# Patient Record
Sex: FEMALE | Race: WHITE | Employment: OTHER | URBAN - NONMETROPOLITAN AREA
[De-identification: names, ages, dates, MRNs, and addresses within clinical notes are randomized per-mention and may not be internally consistent; named-entity substitution may affect disease eponyms.]

---

## 2019-01-01 ENCOUNTER — HOSPITAL ENCOUNTER (OUTPATIENT)
Dept: GENERAL RADIOLOGY | Age: 74
Discharge: HOME | End: 2019-11-19
Payer: MEDICARE

## 2019-01-01 ENCOUNTER — HOSPITAL ENCOUNTER (EMERGENCY)
Dept: GENERAL RADIOLOGY | Age: 74
Discharge: HOME | End: 2019-11-19
Payer: MEDICARE

## 2019-01-01 ENCOUNTER — HOSPITAL ENCOUNTER (OUTPATIENT)
Age: 74
Discharge: HOME | End: 2019-12-10
Payer: MEDICARE

## 2019-01-01 ENCOUNTER — TELEPHONE (OUTPATIENT)
Dept: WOUND CARE | Age: 74
End: 2019-01-01

## 2019-01-01 ENCOUNTER — TELEPHONE (OUTPATIENT)
Dept: FAMILY MEDICINE CLINIC | Age: 74
End: 2019-01-01

## 2019-01-01 ENCOUNTER — HOSPITAL ENCOUNTER (OUTPATIENT)
Dept: PULMONOLOGY | Age: 74
Discharge: HOME | End: 2019-11-07
Payer: MEDICARE

## 2019-01-01 ENCOUNTER — HOSPITAL ENCOUNTER (EMERGENCY)
Age: 74
Discharge: HOME | End: 2019-11-19
Payer: MEDICARE

## 2019-01-01 ENCOUNTER — HOSPITAL ENCOUNTER (OUTPATIENT)
Dept: WOUND CARE | Age: 74
Discharge: HOME | End: 2019-11-07
Payer: MEDICARE

## 2019-01-01 ENCOUNTER — OFFICE VISIT (OUTPATIENT)
Dept: FAMILY MEDICINE CLINIC | Age: 74
End: 2019-01-01
Payer: MEDICARE

## 2019-01-01 ENCOUNTER — TELEPHONE (OUTPATIENT)
Dept: PULMONOLOGY | Age: 74
End: 2019-01-01

## 2019-01-01 ENCOUNTER — OFFICE VISIT (OUTPATIENT)
Dept: PULMONOLOGY | Age: 74
End: 2019-01-01
Payer: MEDICARE

## 2019-01-01 ENCOUNTER — TELEPHONE (OUTPATIENT)
Dept: ADMINISTRATIVE | Age: 74
End: 2019-01-01

## 2019-01-01 ENCOUNTER — HOSPITAL ENCOUNTER (OUTPATIENT)
Age: 74
Discharge: HOME | End: 2019-11-19
Payer: MEDICARE

## 2019-01-01 VITALS
DIASTOLIC BLOOD PRESSURE: 70 MMHG | HEIGHT: 66 IN | TEMPERATURE: 98.1 F | HEART RATE: 78 BPM | BODY MASS INDEX: 44.2 KG/M2 | OXYGEN SATURATION: 97 % | SYSTOLIC BLOOD PRESSURE: 138 MMHG | WEIGHT: 275 LBS

## 2019-01-01 VITALS
DIASTOLIC BLOOD PRESSURE: 62 MMHG | HEART RATE: 76 BPM | TEMPERATURE: 98.2 F | RESPIRATION RATE: 10 BRPM | SYSTOLIC BLOOD PRESSURE: 132 MMHG

## 2019-01-01 VITALS
DIASTOLIC BLOOD PRESSURE: 70 MMHG | RESPIRATION RATE: 18 BRPM | HEART RATE: 72 BPM | SYSTOLIC BLOOD PRESSURE: 136 MMHG | TEMPERATURE: 98.6 F | OXYGEN SATURATION: 95 %

## 2019-01-01 VITALS
HEIGHT: 66 IN | RESPIRATION RATE: 16 BRPM | DIASTOLIC BLOOD PRESSURE: 70 MMHG | SYSTOLIC BLOOD PRESSURE: 189 MMHG | TEMPERATURE: 97.3 F | HEART RATE: 72 BPM | WEIGHT: 275 LBS | OXYGEN SATURATION: 96 % | BODY MASS INDEX: 44.2 KG/M2

## 2019-01-01 VITALS
RESPIRATION RATE: 16 BRPM | SYSTOLIC BLOOD PRESSURE: 122 MMHG | TEMPERATURE: 97.9 F | DIASTOLIC BLOOD PRESSURE: 58 MMHG | HEART RATE: 80 BPM

## 2019-01-01 DIAGNOSIS — E11.69 TYPE 2 DIABETES MELLITUS WITH OTHER SPECIFIED COMPLICATION, WITH LONG-TERM CURRENT USE OF INSULIN (HCC): ICD-10-CM

## 2019-01-01 DIAGNOSIS — E03.9 HYPOTHYROIDISM, UNSPECIFIED TYPE: ICD-10-CM

## 2019-01-01 DIAGNOSIS — Z79.4 TYPE 2 DIABETES MELLITUS WITH OTHER SPECIFIED COMPLICATION, WITH LONG-TERM CURRENT USE OF INSULIN (HCC): Primary | ICD-10-CM

## 2019-01-01 DIAGNOSIS — J47.9 BRONCHIECTASIS WITHOUT COMPLICATION (HCC): ICD-10-CM

## 2019-01-01 DIAGNOSIS — G89.29 CHRONIC BILATERAL LOW BACK PAIN WITHOUT SCIATICA: Primary | Chronic | ICD-10-CM

## 2019-01-01 DIAGNOSIS — M25.512 CHRONIC PAIN OF BOTH SHOULDERS: ICD-10-CM

## 2019-01-01 DIAGNOSIS — I89.0 LYMPHEDEMA OF BOTH LOWER EXTREMITIES: ICD-10-CM

## 2019-01-01 DIAGNOSIS — E11.42 DIABETIC POLYNEUROPATHY ASSOCIATED WITH TYPE 2 DIABETES MELLITUS (HCC): ICD-10-CM

## 2019-01-01 DIAGNOSIS — M25.511 CHRONIC PAIN OF BOTH SHOULDERS: ICD-10-CM

## 2019-01-01 DIAGNOSIS — E11.69 TYPE 2 DIABETES MELLITUS WITH OTHER SPECIFIED COMPLICATION, WITH LONG-TERM CURRENT USE OF INSULIN (HCC): Primary | ICD-10-CM

## 2019-01-01 DIAGNOSIS — R80.9 MICROALBUMINURIA DUE TO TYPE 2 DIABETES MELLITUS (HCC): Chronic | ICD-10-CM

## 2019-01-01 DIAGNOSIS — J45.41 MODERATE PERSISTENT ASTHMA WITH EXACERBATION: Primary | ICD-10-CM

## 2019-01-01 DIAGNOSIS — J47.9 BRONCHIECTASIS WITHOUT COMPLICATION (HCC): Chronic | ICD-10-CM

## 2019-01-01 DIAGNOSIS — I48.91 ATRIAL FIBRILLATION, UNSPECIFIED TYPE (HCC): ICD-10-CM

## 2019-01-01 DIAGNOSIS — S63.501A SPRAIN OF RIGHT WRIST, INITIAL ENCOUNTER: Primary | ICD-10-CM

## 2019-01-01 DIAGNOSIS — N18.30 CKD (CHRONIC KIDNEY DISEASE) STAGE 3, GFR 30-59 ML/MIN (HCC): ICD-10-CM

## 2019-01-01 DIAGNOSIS — E78.5 DYSLIPIDEMIA: ICD-10-CM

## 2019-01-01 DIAGNOSIS — M54.50 CHRONIC BILATERAL LOW BACK PAIN WITHOUT SCIATICA: Primary | Chronic | ICD-10-CM

## 2019-01-01 DIAGNOSIS — G89.29 CHRONIC PAIN OF BOTH SHOULDERS: ICD-10-CM

## 2019-01-01 DIAGNOSIS — K04.7 DENTAL INFECTION: Primary | ICD-10-CM

## 2019-01-01 DIAGNOSIS — J44.9 ASTHMA-COPD OVERLAP SYNDROME (HCC): Primary | ICD-10-CM

## 2019-01-01 DIAGNOSIS — I25.10 ASHD (ARTERIOSCLEROTIC HEART DISEASE): ICD-10-CM

## 2019-01-01 DIAGNOSIS — Z23 NEEDS FLU SHOT: ICD-10-CM

## 2019-01-01 DIAGNOSIS — E11.29 MICROALBUMINURIA DUE TO TYPE 2 DIABETES MELLITUS (HCC): Chronic | ICD-10-CM

## 2019-01-01 DIAGNOSIS — J44.9 ASTHMA-COPD OVERLAP SYNDROME (HCC): ICD-10-CM

## 2019-01-01 DIAGNOSIS — N89.8 VAGINAL CYST: ICD-10-CM

## 2019-01-01 DIAGNOSIS — S81.801D WOUND OF RIGHT LOWER EXTREMITY, SUBSEQUENT ENCOUNTER: ICD-10-CM

## 2019-01-01 DIAGNOSIS — Z86.19 HISTORY OF SEPSIS: ICD-10-CM

## 2019-01-01 DIAGNOSIS — Z12.31 ENCOUNTER FOR SCREENING MAMMOGRAM FOR MALIGNANT NEOPLASM OF BREAST: ICD-10-CM

## 2019-01-01 DIAGNOSIS — I10 HYPERTENSION, ESSENTIAL: ICD-10-CM

## 2019-01-01 DIAGNOSIS — G47.33 OSA (OBSTRUCTIVE SLEEP APNEA): ICD-10-CM

## 2019-01-01 DIAGNOSIS — D64.9 NORMOCYTIC ANEMIA: ICD-10-CM

## 2019-01-01 DIAGNOSIS — K04.7 DENTAL INFECTION: ICD-10-CM

## 2019-01-01 DIAGNOSIS — E66.01 MORBID OBESITY WITH BMI OF 40.0-44.9, ADULT (HCC): ICD-10-CM

## 2019-01-01 DIAGNOSIS — S81.802A WOUND OF LEFT LOWER EXTREMITY, INITIAL ENCOUNTER: ICD-10-CM

## 2019-01-01 DIAGNOSIS — S81.801A WOUND OF RIGHT LOWER EXTREMITY, INITIAL ENCOUNTER: ICD-10-CM

## 2019-01-01 DIAGNOSIS — G25.81 RLS (RESTLESS LEGS SYNDROME): ICD-10-CM

## 2019-01-01 DIAGNOSIS — S81.801D WOUND OF RIGHT LOWER EXTREMITY, SUBSEQUENT ENCOUNTER: Primary | ICD-10-CM

## 2019-01-01 DIAGNOSIS — Z79.4 TYPE 2 DIABETES MELLITUS WITH OTHER SPECIFIED COMPLICATION, WITH LONG-TERM CURRENT USE OF INSULIN (HCC): ICD-10-CM

## 2019-01-01 DIAGNOSIS — N18.30 CKD (CHRONIC KIDNEY DISEASE) STAGE 3, GFR 30-59 ML/MIN (HCC): Chronic | ICD-10-CM

## 2019-01-01 DIAGNOSIS — E83.42 HYPOMAGNESEMIA: ICD-10-CM

## 2019-01-01 DIAGNOSIS — S81.802D WOUND OF LEFT LOWER EXTREMITY, SUBSEQUENT ENCOUNTER: ICD-10-CM

## 2019-01-01 DIAGNOSIS — J39.8 TRACHEOMALACIA, ACQUIRED: ICD-10-CM

## 2019-01-01 LAB
AEROBIC CULTURE: ABNORMAL
AEROBIC CULTURE: ABNORMAL
ALBUMIN SERPL-MCNC: 3.9 G/DL (ref 3.5–5.1)
ALP BLD-CCNC: 85 U/L (ref 38–126)
ALT SERPL-CCNC: 13 U/L (ref 11–66)
ANION GAP SERPL CALCULATED.3IONS-SCNC: 14 MEQ/L (ref 8–16)
AST SERPL-CCNC: 21 U/L (ref 5–40)
BASOPHILS # BLD: 0.6 %
BASOPHILS ABSOLUTE: 0.1 THOU/MM3 (ref 0–0.1)
BILIRUB SERPL-MCNC: 0.4 MG/DL (ref 0.3–1.2)
BUN BLDV-MCNC: 19 MG/DL (ref 7–22)
C-REACTIVE PROTEIN: 1.15 MG/DL (ref 0–1)
CALCIUM SERPL-MCNC: 10 MG/DL (ref 8.5–10.5)
CHLORIDE BLD-SCNC: 100 MEQ/L (ref 98–111)
CHOLESTEROL, TOTAL: 170 MG/DL (ref 100–199)
CO2: 27 MEQ/L (ref 23–33)
CREAT SERPL-MCNC: 0.8 MG/DL (ref 0.4–1.2)
CREATININE, URINE: 41.5 MG/DL
EOSINOPHIL # BLD: 5.3 %
EOSINOPHILS ABSOLUTE: 0.5 THOU/MM3 (ref 0–0.4)
ERYTHROCYTE [DISTWIDTH] IN BLOOD BY AUTOMATED COUNT: 16.4 % (ref 11.5–14.5)
ERYTHROCYTE [DISTWIDTH] IN BLOOD BY AUTOMATED COUNT: 51.3 FL (ref 35–45)
GFR SERPL CREATININE-BSD FRML MDRD: 70 ML/MIN/1.73M2
GLUCOSE BLD-MCNC: 109 MG/DL (ref 70–108)
GRAM STAIN RESULT: ABNORMAL
HBA1C MFR BLD: 8 %
HCT VFR BLD CALC: 33.5 % (ref 37–47)
HDLC SERPL-MCNC: 59 MG/DL
HEMOGLOBIN: 9.8 GM/DL (ref 12–16)
IMMATURE GRANS (ABS): 0.03 THOU/MM3 (ref 0–0.07)
IMMATURE GRANULOCYTES: 0.3 %
LDL CHOLESTEROL CALCULATED: 89 MG/DL
LYMPHOCYTES # BLD: 32.3 %
LYMPHOCYTES ABSOLUTE: 3 THOU/MM3 (ref 1–4.8)
MCH RBC QN AUTO: 25.4 PG (ref 26–33)
MCHC RBC AUTO-ENTMCNC: 29.3 GM/DL (ref 32.2–35.5)
MCV RBC AUTO: 86.8 FL (ref 81–99)
MICROALBUMIN UR-MCNC: 9.85 MG/DL
MICROALBUMIN/CREAT UR-RTO: 237 MG/G (ref 0–30)
MONOCYTES # BLD: 9.8 %
MONOCYTES ABSOLUTE: 0.9 THOU/MM3 (ref 0.4–1.3)
NUCLEATED RED BLOOD CELLS: 0 /100 WBC
ORGANISM: ABNORMAL
PLATELET # BLD: 313 THOU/MM3 (ref 130–400)
PMV BLD AUTO: 9.8 FL (ref 9.4–12.4)
POTASSIUM SERPL-SCNC: 5 MEQ/L (ref 3.5–5.2)
RBC # BLD: 3.86 MILL/MM3 (ref 4.2–5.4)
SEG NEUTROPHILS: 51.7 %
SEGMENTED NEUTROPHILS ABSOLUTE COUNT: 4.9 THOU/MM3 (ref 1.8–7.7)
SODIUM BLD-SCNC: 141 MEQ/L (ref 135–145)
T4 FREE: 0.68 NG/DL (ref 0.93–1.76)
TOTAL PROTEIN: 7.2 G/DL (ref 6.1–8)
TRIGL SERPL-MCNC: 111 MG/DL (ref 0–199)
TSH SERPL DL<=0.05 MIU/L-ACNC: 44.63 UIU/ML (ref 0.4–4.2)
WBC # BLD: 9.4 THOU/MM3 (ref 4.8–10.8)

## 2019-01-01 PROCEDURE — 87077 CULTURE AEROBIC IDENTIFY: CPT

## 2019-01-01 PROCEDURE — 87070 CULTURE OTHR SPECIMN AEROBIC: CPT

## 2019-01-01 PROCEDURE — G8417 CALC BMI ABV UP PARAM F/U: HCPCS | Performed by: INTERNAL MEDICINE

## 2019-01-01 PROCEDURE — G8926 SPIRO NO PERF OR DOC: HCPCS | Performed by: INTERNAL MEDICINE

## 2019-01-01 PROCEDURE — 3017F COLORECTAL CA SCREEN DOC REV: CPT | Performed by: FAMILY MEDICINE

## 2019-01-01 PROCEDURE — G8482 FLU IMMUNIZE ORDER/ADMIN: HCPCS | Performed by: INTERNAL MEDICINE

## 2019-01-01 PROCEDURE — 94060 EVALUATION OF WHEEZING: CPT

## 2019-01-01 PROCEDURE — G8598 ASA/ANTIPLAT THER USED: HCPCS | Performed by: INTERNAL MEDICINE

## 2019-01-01 PROCEDURE — 99204 OFFICE O/P NEW MOD 45 MIN: CPT | Performed by: INTERNAL MEDICINE

## 2019-01-01 PROCEDURE — 80053 COMPREHEN METABOLIC PANEL: CPT

## 2019-01-01 PROCEDURE — 83036 HEMOGLOBIN GLYCOSYLATED A1C: CPT | Performed by: FAMILY MEDICINE

## 2019-01-01 PROCEDURE — 86140 C-REACTIVE PROTEIN: CPT

## 2019-01-01 PROCEDURE — 73590 X-RAY EXAM OF LOWER LEG: CPT

## 2019-01-01 PROCEDURE — 3045F PR MOST RECENT HEMOGLOBIN A1C LEVEL 7.0-9.0%: CPT | Performed by: FAMILY MEDICINE

## 2019-01-01 PROCEDURE — 1090F PRES/ABSN URINE INCON ASSESS: CPT | Performed by: FAMILY MEDICINE

## 2019-01-01 PROCEDURE — 36415 COLL VENOUS BLD VENIPUNCTURE: CPT

## 2019-01-01 PROCEDURE — 2022F DILAT RTA XM EVC RTNOPTHY: CPT | Performed by: FAMILY MEDICINE

## 2019-01-01 PROCEDURE — 1123F ACP DISCUSS/DSCN MKR DOCD: CPT | Performed by: FAMILY MEDICINE

## 2019-01-01 PROCEDURE — 1036F TOBACCO NON-USER: CPT | Performed by: FAMILY MEDICINE

## 2019-01-01 PROCEDURE — 99204 OFFICE O/P NEW MOD 45 MIN: CPT | Performed by: FAMILY MEDICINE

## 2019-01-01 PROCEDURE — 3023F SPIROM DOC REV: CPT | Performed by: INTERNAL MEDICINE

## 2019-01-01 PROCEDURE — 84443 ASSAY THYROID STIM HORMONE: CPT

## 2019-01-01 PROCEDURE — G8421 BMI NOT CALCULATED: HCPCS | Performed by: FAMILY MEDICINE

## 2019-01-01 PROCEDURE — 87147 CULTURE TYPE IMMUNOLOGIC: CPT

## 2019-01-01 PROCEDURE — 3023F SPIROM DOC REV: CPT | Performed by: FAMILY MEDICINE

## 2019-01-01 PROCEDURE — G8598 ASA/ANTIPLAT THER USED: HCPCS | Performed by: FAMILY MEDICINE

## 2019-01-01 PROCEDURE — G8427 DOCREV CUR MEDS BY ELIG CLIN: HCPCS | Performed by: INTERNAL MEDICINE

## 2019-01-01 PROCEDURE — G8427 DOCREV CUR MEDS BY ELIG CLIN: HCPCS | Performed by: FAMILY MEDICINE

## 2019-01-01 PROCEDURE — 4040F PNEUMOC VAC/ADMIN/RCVD: CPT | Performed by: FAMILY MEDICINE

## 2019-01-01 PROCEDURE — 2709999900 HC NON-CHARGEABLE SUPPLY

## 2019-01-01 PROCEDURE — 99213 OFFICE O/P EST LOW 20 MIN: CPT | Performed by: NURSE PRACTITIONER

## 2019-01-01 PROCEDURE — 71046 X-RAY EXAM CHEST 2 VIEWS: CPT

## 2019-01-01 PROCEDURE — 82043 UR ALBUMIN QUANTITATIVE: CPT

## 2019-01-01 PROCEDURE — G8400 PT W/DXA NO RESULTS DOC: HCPCS | Performed by: FAMILY MEDICINE

## 2019-01-01 PROCEDURE — 4040F PNEUMOC VAC/ADMIN/RCVD: CPT | Performed by: INTERNAL MEDICINE

## 2019-01-01 PROCEDURE — 94729 DIFFUSING CAPACITY: CPT

## 2019-01-01 PROCEDURE — 1123F ACP DISCUSS/DSCN MKR DOCD: CPT | Performed by: INTERNAL MEDICINE

## 2019-01-01 PROCEDURE — G8926 SPIRO NO PERF OR DOC: HCPCS | Performed by: FAMILY MEDICINE

## 2019-01-01 PROCEDURE — 3017F COLORECTAL CA SCREEN DOC REV: CPT | Performed by: INTERNAL MEDICINE

## 2019-01-01 PROCEDURE — G8482 FLU IMMUNIZE ORDER/ADMIN: HCPCS | Performed by: FAMILY MEDICINE

## 2019-01-01 PROCEDURE — 73110 X-RAY EXAM OF WRIST: CPT

## 2019-01-01 PROCEDURE — 85025 COMPLETE CBC W/AUTO DIFF WBC: CPT

## 2019-01-01 PROCEDURE — 99213 OFFICE O/P EST LOW 20 MIN: CPT

## 2019-01-01 PROCEDURE — G0008 ADMIN INFLUENZA VIRUS VAC: HCPCS | Performed by: FAMILY MEDICINE

## 2019-01-01 PROCEDURE — 1036F TOBACCO NON-USER: CPT | Performed by: INTERNAL MEDICINE

## 2019-01-01 PROCEDURE — 99214 OFFICE O/P EST MOD 30 MIN: CPT | Performed by: FAMILY MEDICINE

## 2019-01-01 PROCEDURE — 84439 ASSAY OF FREE THYROXINE: CPT

## 2019-01-01 PROCEDURE — G8400 PT W/DXA NO RESULTS DOC: HCPCS | Performed by: INTERNAL MEDICINE

## 2019-01-01 PROCEDURE — 90653 IIV ADJUVANT VACCINE IM: CPT | Performed by: FAMILY MEDICINE

## 2019-01-01 PROCEDURE — 1090F PRES/ABSN URINE INCON ASSESS: CPT | Performed by: INTERNAL MEDICINE

## 2019-01-01 PROCEDURE — G8417 CALC BMI ABV UP PARAM F/U: HCPCS | Performed by: FAMILY MEDICINE

## 2019-01-01 PROCEDURE — 87186 SC STD MICRODIL/AGAR DIL: CPT

## 2019-01-01 PROCEDURE — 99204 OFFICE O/P NEW MOD 45 MIN: CPT | Performed by: NURSE PRACTITIONER

## 2019-01-01 PROCEDURE — 87205 SMEAR GRAM STAIN: CPT

## 2019-01-01 PROCEDURE — 80061 LIPID PANEL: CPT

## 2019-01-01 RX ORDER — AMOXICILLIN AND CLAVULANATE POTASSIUM 875; 125 MG/1; MG/1
1 TABLET, FILM COATED ORAL 2 TIMES DAILY
COMMUNITY
Start: 2019-01-01 | End: 2019-01-01

## 2019-01-01 RX ORDER — PREDNISONE 20 MG/1
20 TABLET ORAL 2 TIMES DAILY
Qty: 14 TABLET | Refills: 0 | Status: SHIPPED | OUTPATIENT
Start: 2019-01-01 | End: 2019-01-01

## 2019-01-01 RX ORDER — LANOLIN ALCOHOL/MO/W.PET/CERES
325 CREAM (GRAM) TOPICAL 2 TIMES DAILY WITH MEALS
COMMUNITY
End: 2019-01-01

## 2019-01-01 RX ORDER — ALBUTEROL SULFATE 90 UG/1
2 AEROSOL, METERED RESPIRATORY (INHALATION) EVERY 4 HOURS PRN
Qty: 3 INHALER | Refills: 3 | Status: SHIPPED | OUTPATIENT
Start: 2019-01-01

## 2019-01-01 RX ORDER — ALBUTEROL SULFATE 90 UG/1
2 AEROSOL, METERED RESPIRATORY (INHALATION) EVERY 6 HOURS PRN
COMMUNITY
End: 2019-01-01

## 2019-01-01 RX ORDER — CHLORTHALIDONE 25 MG/1
1 TABLET ORAL DAILY
COMMUNITY
End: 2019-01-01

## 2019-01-01 RX ORDER — MAGNESIUM CHLORIDE 64 MG
1 TABLET, DELAYED RELEASE (ENTERIC COATED) ORAL 2 TIMES DAILY WITH MEALS
Status: CANCELLED | OUTPATIENT
Start: 2019-01-01

## 2019-01-01 RX ORDER — FUROSEMIDE 20 MG/1
10 TABLET ORAL DAILY PRN
COMMUNITY

## 2019-01-01 RX ORDER — CARVEDILOL 25 MG/1
50 TABLET ORAL 2 TIMES DAILY WITH MEALS
COMMUNITY
End: 2019-01-01

## 2019-01-01 RX ORDER — AZELASTINE HYDROCHLORIDE 137 UG/1
2 SPRAY, METERED NASAL DAILY
COMMUNITY
Start: 2018-03-27

## 2019-01-01 RX ORDER — MAGNESIUM CHLORIDE 64 MG
1 TABLET, DELAYED RELEASE (ENTERIC COATED) ORAL 2 TIMES DAILY WITH MEALS
COMMUNITY
End: 2019-01-01 | Stop reason: SDUPTHER

## 2019-01-01 RX ORDER — BUDESONIDE AND FORMOTEROL FUMARATE DIHYDRATE 160; 4.5 UG/1; UG/1
2 AEROSOL RESPIRATORY (INHALATION) 2 TIMES DAILY
COMMUNITY
End: 2019-01-01

## 2019-01-01 RX ORDER — OXYBUTYNIN CHLORIDE 5 MG/1
5 TABLET ORAL DAILY PRN
COMMUNITY
End: 2020-01-01 | Stop reason: SDUPTHER

## 2019-01-01 RX ORDER — LEVALBUTEROL TARTRATE 45 UG/1
1-2 AEROSOL, METERED ORAL EVERY 4 HOURS PRN
Qty: 3 INHALER | Refills: 3 | Status: SHIPPED | OUTPATIENT
Start: 2019-01-01 | End: 2019-01-01

## 2019-01-01 RX ORDER — ORPHENADRINE CITRATE 100 MG/1
100 TABLET, EXTENDED RELEASE ORAL 2 TIMES DAILY PRN
COMMUNITY
End: 2019-01-01 | Stop reason: SDUPTHER

## 2019-01-01 RX ORDER — PEN NEEDLE, DIABETIC 30 GX3/16"
NEEDLE, DISPOSABLE MISCELLANEOUS
Qty: 400 EACH | Refills: 3 | Status: SHIPPED | OUTPATIENT
Start: 2019-01-01

## 2019-01-01 RX ORDER — AMOXICILLIN 500 MG/1
CAPSULE ORAL
Qty: 7 CAPSULE | Refills: 0 | Status: ON HOLD | OUTPATIENT
Start: 2019-01-01 | End: 2020-01-01 | Stop reason: HOSPADM

## 2019-01-01 RX ORDER — LOSARTAN POTASSIUM 100 MG/1
100 TABLET ORAL DAILY
COMMUNITY
End: 2020-01-01 | Stop reason: SDUPTHER

## 2019-01-01 RX ORDER — LISINOPRIL 40 MG/1
1 TABLET ORAL DAILY
COMMUNITY
End: 2019-01-01

## 2019-01-01 RX ORDER — ACETAMINOPHEN 500 MG
1 TABLET ORAL 2 TIMES DAILY PRN
COMMUNITY

## 2019-01-01 RX ORDER — ALBUTEROL SULFATE 90 UG/1
2 AEROSOL, METERED RESPIRATORY (INHALATION) EVERY 6 HOURS PRN
Qty: 1 INHALER | Refills: 5 | Status: CANCELLED | OUTPATIENT
Start: 2019-01-01

## 2019-01-01 RX ORDER — SODIUM HYPOCHLORITE 1.25 MG/ML
SOLUTION TOPICAL
Qty: 1 BOTTLE | Refills: 5 | Status: SHIPPED | OUTPATIENT
Start: 2019-01-01

## 2019-01-01 RX ORDER — DILTIAZEM HYDROCHLORIDE 120 MG/1
120 CAPSULE, COATED, EXTENDED RELEASE ORAL 2 TIMES DAILY
COMMUNITY
End: 2019-01-01 | Stop reason: SDUPTHER

## 2019-01-01 RX ORDER — LEVOTHYROXINE SODIUM 88 UG/1
88 TABLET ORAL DAILY
Qty: 30 TABLET | Refills: 3 | Status: SHIPPED
Start: 2019-01-01 | End: 2020-01-01 | Stop reason: DRUGHIGH

## 2019-01-01 RX ORDER — LEVOTHYROXINE SODIUM 0.07 MG/1
75 TABLET ORAL DAILY
COMMUNITY
End: 2019-01-01 | Stop reason: DRUGHIGH

## 2019-01-01 RX ORDER — BENZONATATE 200 MG/1
200 CAPSULE ORAL 3 TIMES DAILY PRN
Qty: 90 CAPSULE | Refills: 5 | Status: ON HOLD | OUTPATIENT
Start: 2019-01-01 | End: 2020-01-01 | Stop reason: HOSPADM

## 2019-01-01 RX ORDER — ORPHENADRINE CITRATE 100 MG/1
100 TABLET, EXTENDED RELEASE ORAL 2 TIMES DAILY PRN
Status: CANCELLED | OUTPATIENT
Start: 2019-01-01

## 2019-01-01 RX ORDER — DILTIAZEM HYDROCHLORIDE 120 MG/1
120 CAPSULE, COATED, EXTENDED RELEASE ORAL 2 TIMES DAILY
Qty: 180 CAPSULE | Refills: 1 | Status: SHIPPED | OUTPATIENT
Start: 2019-01-01 | End: 2020-01-01 | Stop reason: SDUPTHER

## 2019-01-01 RX ORDER — OMEGA-3-ACID ETHYL ESTERS 1 G/1
2 CAPSULE, LIQUID FILLED ORAL 2 TIMES DAILY
COMMUNITY
End: 2019-01-01

## 2019-01-01 RX ORDER — GUAIFENESIN 600 MG/1
1200 TABLET, EXTENDED RELEASE ORAL 2 TIMES DAILY
Status: ON HOLD | COMMUNITY
Start: 2013-03-26 | End: 2020-01-01 | Stop reason: HOSPADM

## 2019-01-01 RX ORDER — LEVOTHYROXINE SODIUM 0.03 MG/1
50 TABLET ORAL DAILY
COMMUNITY
End: 2019-01-01

## 2019-01-01 RX ORDER — ORPHENADRINE CITRATE 100 MG/1
100 TABLET, EXTENDED RELEASE ORAL 2 TIMES DAILY PRN
Qty: 180 TABLET | Refills: 3 | Status: SHIPPED | OUTPATIENT
Start: 2019-01-01

## 2019-01-01 RX ORDER — CETIRIZINE HYDROCHLORIDE 10 MG/1
10 TABLET ORAL DAILY
COMMUNITY
End: 2019-01-01

## 2019-01-01 RX ORDER — BUDESONIDE AND FORMOTEROL FUMARATE DIHYDRATE 160; 4.5 UG/1; UG/1
2 AEROSOL RESPIRATORY (INHALATION) 2 TIMES DAILY
Qty: 3 INHALER | Refills: 5 | Status: SHIPPED | OUTPATIENT
Start: 2019-01-01

## 2019-01-01 RX ORDER — WITCH HAZEL 50 %
1 PADS, MEDICATED (EA) TOPICAL DAILY
COMMUNITY
End: 2019-01-01

## 2019-01-01 RX ORDER — PEN NEEDLE, DIABETIC 30 GX3/16"
4 NEEDLE, DISPOSABLE MISCELLANEOUS DAILY
COMMUNITY
End: 2019-01-01 | Stop reason: SDUPTHER

## 2019-01-01 RX ORDER — DOCUSATE SODIUM 100 MG/1
100 CAPSULE, LIQUID FILLED ORAL DAILY
COMMUNITY
End: 2019-01-01

## 2019-01-01 RX ORDER — AMOXICILLIN 500 MG/1
CAPSULE ORAL
Qty: 7 CAPSULE | Refills: 0 | Status: SHIPPED | OUTPATIENT
Start: 2019-01-01 | End: 2019-01-01

## 2019-01-01 RX ORDER — LEVOTHYROXINE SODIUM 0.07 MG/1
75 TABLET ORAL DAILY
Status: CANCELLED | OUTPATIENT
Start: 2019-01-01

## 2019-01-01 RX ORDER — MAGNESIUM CHLORIDE 64 MG
1 TABLET, DELAYED RELEASE (ENTERIC COATED) ORAL 2 TIMES DAILY WITH MEALS
Qty: 180 TABLET | Refills: 3 | Status: SHIPPED | OUTPATIENT
Start: 2019-01-01 | End: 2020-01-01 | Stop reason: SDUPTHER

## 2019-01-01 ASSESSMENT — ENCOUNTER SYMPTOMS
CHEST TIGHTNESS: 1
SINUS PAIN: 1
SHORTNESS OF BREATH: 1
EYE REDNESS: 0
RHINORRHEA: 1
NAUSEA: 0
STRIDOR: 1
SHORTNESS OF BREATH: 0
VOICE CHANGE: 0
TROUBLE SWALLOWING: 0
EYE DISCHARGE: 0
ABDOMINAL PAIN: 1
WHEEZING: 1
SORE THROAT: 0
COUGH: 1
APNEA: 1
VOMITING: 0
CONSTIPATION: 1
CHOKING: 1
DIARRHEA: 0
VOMITING: 0
SINUS PRESSURE: 1
PHOTOPHOBIA: 0
ABDOMINAL DISTENTION: 1
EYE ITCHING: 0
EYE PAIN: 0
BACK PAIN: 1
COLOR CHANGE: 0

## 2019-01-01 ASSESSMENT — PAIN DESCRIPTION - PAIN TYPE: TYPE: ACUTE PAIN

## 2019-01-01 ASSESSMENT — PATIENT HEALTH QUESTIONNAIRE - PHQ9
SUM OF ALL RESPONSES TO PHQ9 QUESTIONS 1 & 2: 0
SUM OF ALL RESPONSES TO PHQ QUESTIONS 1-9: 0
1. LITTLE INTEREST OR PLEASURE IN DOING THINGS: 0
SUM OF ALL RESPONSES TO PHQ QUESTIONS 1-9: 0
2. FEELING DOWN, DEPRESSED OR HOPELESS: 0

## 2019-01-01 ASSESSMENT — PAIN DESCRIPTION - LOCATION: LOCATION: WRIST

## 2019-01-01 ASSESSMENT — PAIN DESCRIPTION - DESCRIPTORS: DESCRIPTORS: ACHING

## 2019-01-01 ASSESSMENT — PAIN SCALES - GENERAL: PAINLEVEL_OUTOF10: 7

## 2019-01-01 ASSESSMENT — PAIN DESCRIPTION - ORIENTATION: ORIENTATION: RIGHT

## 2019-10-07 PROBLEM — S81.802A WOUND OF LEFT LEG: Status: ACTIVE | Noted: 2019-01-01

## 2019-10-07 PROBLEM — S81.801A WOUND OF RIGHT LEG: Status: ACTIVE | Noted: 2019-01-01

## 2019-11-07 PROBLEM — I89.0 LYMPHEDEMA OF BOTH LOWER EXTREMITIES: Status: ACTIVE | Noted: 2019-01-01

## 2019-12-10 PROBLEM — D64.9 NORMOCYTIC ANEMIA: Status: ACTIVE | Noted: 2019-01-01

## 2020-01-01 ENCOUNTER — APPOINTMENT (OUTPATIENT)
Dept: INTERVENTIONAL RADIOLOGY/VASCULAR | Age: 75
DRG: 554 | End: 2020-01-01
Payer: MEDICARE

## 2020-01-01 ENCOUNTER — TELEPHONE (OUTPATIENT)
Dept: FAMILY MEDICINE CLINIC | Age: 75
End: 2020-01-01

## 2020-01-01 ENCOUNTER — APPOINTMENT (OUTPATIENT)
Dept: NON INVASIVE DIAGNOSTICS | Age: 75
DRG: 554 | End: 2020-01-01
Payer: MEDICARE

## 2020-01-01 ENCOUNTER — PATIENT MESSAGE (OUTPATIENT)
Dept: FAMILY MEDICINE CLINIC | Age: 75
End: 2020-01-01

## 2020-01-01 ENCOUNTER — APPOINTMENT (OUTPATIENT)
Dept: GENERAL RADIOLOGY | Age: 75
DRG: 554 | End: 2020-01-01
Payer: MEDICARE

## 2020-01-01 ENCOUNTER — APPOINTMENT (OUTPATIENT)
Dept: GENERAL RADIOLOGY | Age: 75
DRG: 064 | End: 2020-01-01
Payer: MEDICARE

## 2020-01-01 ENCOUNTER — APPOINTMENT (OUTPATIENT)
Dept: CT IMAGING | Age: 75
DRG: 064 | End: 2020-01-01
Payer: MEDICARE

## 2020-01-01 ENCOUNTER — HOSPITAL ENCOUNTER (OUTPATIENT)
Age: 75
Discharge: HOME | End: 2020-03-12
Payer: MEDICARE

## 2020-01-01 ENCOUNTER — APPOINTMENT (OUTPATIENT)
Dept: CT IMAGING | Age: 75
DRG: 554 | End: 2020-01-01
Payer: MEDICARE

## 2020-01-01 ENCOUNTER — HOSPITAL ENCOUNTER (INPATIENT)
Age: 75
LOS: 3 days | Discharge: NURSING, REHAB OR LONG-TERM CARE | DRG: 554 | End: 2020-01-20
Attending: INTERNAL MEDICINE | Admitting: INTERNAL MEDICINE
Payer: MEDICARE

## 2020-01-01 ENCOUNTER — TELEPHONE (OUTPATIENT)
Dept: PULMONOLOGY | Age: 75
End: 2020-01-01

## 2020-01-01 ENCOUNTER — HOSPITAL ENCOUNTER (INPATIENT)
Age: 75
LOS: 2 days | DRG: 064 | End: 2020-04-17
Attending: EMERGENCY MEDICINE | Admitting: INTERNAL MEDICINE
Payer: MEDICARE

## 2020-01-01 VITALS
RESPIRATION RATE: 16 BRPM | TEMPERATURE: 98.3 F | HEIGHT: 68 IN | OXYGEN SATURATION: 87 % | HEART RATE: 74 BPM | SYSTOLIC BLOOD PRESSURE: 137 MMHG | BODY MASS INDEX: 41.52 KG/M2 | WEIGHT: 274 LBS | DIASTOLIC BLOOD PRESSURE: 61 MMHG

## 2020-01-01 VITALS
HEIGHT: 68 IN | TEMPERATURE: 99.1 F | RESPIRATION RATE: 10 BRPM | HEART RATE: 30 BPM | BODY MASS INDEX: 41.52 KG/M2 | DIASTOLIC BLOOD PRESSURE: 53 MMHG | OXYGEN SATURATION: 57 % | SYSTOLIC BLOOD PRESSURE: 103 MMHG | WEIGHT: 274 LBS

## 2020-01-01 DIAGNOSIS — E03.9 HYPOTHYROIDISM, UNSPECIFIED TYPE: ICD-10-CM

## 2020-01-01 DIAGNOSIS — D64.9 NORMOCYTIC ANEMIA: ICD-10-CM

## 2020-01-01 LAB
ABSOLUTE RETIC #: 82 THOU/MM3 (ref 20–115)
ALBUMIN SERPL-MCNC: 3.9 G/DL (ref 3.5–5.1)
ALP BLD-CCNC: 74 U/L (ref 38–126)
ALT SERPL-CCNC: 14 U/L (ref 11–66)
ANAEROBIC CULTURE: NORMAL
ANION GAP SERPL CALCULATED.3IONS-SCNC: 12 MEQ/L (ref 8–16)
ANION GAP SERPL CALCULATED.3IONS-SCNC: 13 MEQ/L (ref 8–16)
ANION GAP SERPL CALCULATED.3IONS-SCNC: 14 MEQ/L (ref 8–16)
ANION GAP SERPL CALCULATED.3IONS-SCNC: 15 MEQ/L (ref 8–16)
ANISOCYTOSIS: PRESENT
AST SERPL-CCNC: 27 U/L (ref 5–40)
AVERAGE GLUCOSE: 180 MG/DL (ref 70–126)
BACTERIA: ABNORMAL /HPF
BASOPHILS # BLD: 0.3 %
BASOPHILS # BLD: 0.3 %
BASOPHILS # BLD: 0.4 %
BASOPHILS # BLD: 0.5 %
BASOPHILS # BLD: 0.5 %
BASOPHILS # BLD: 0.6 %
BASOPHILS # BLD: 0.6 %
BASOPHILS # BLD: 0.7 %
BASOPHILS ABSOLUTE: 0 THOU/MM3 (ref 0–0.1)
BASOPHILS ABSOLUTE: 0.1 THOU/MM3 (ref 0–0.1)
BILIRUB SERPL-MCNC: 0.5 MG/DL (ref 0.3–1.2)
BILIRUBIN URINE: NEGATIVE
BLOOD, URINE: ABNORMAL
BODY FLUID CULTURE, STERILE: NORMAL
BUN BLDV-MCNC: 12 MG/DL (ref 7–22)
BUN BLDV-MCNC: 14 MG/DL (ref 7–22)
BUN BLDV-MCNC: 14 MG/DL (ref 7–22)
BUN BLDV-MCNC: 15 MG/DL (ref 7–22)
BUN BLDV-MCNC: 16 MG/DL (ref 7–22)
BUN BLDV-MCNC: 17 MG/DL (ref 7–22)
BUN BLDV-MCNC: 18 MG/DL (ref 7–22)
BUN BLDV-MCNC: 22 MG/DL (ref 7–22)
BUN BLDV-MCNC: 24 MG/DL (ref 7–22)
CALCIUM SERPL-MCNC: 8.6 MG/DL (ref 8.5–10.5)
CALCIUM SERPL-MCNC: 9 MG/DL (ref 8.5–10.5)
CALCIUM SERPL-MCNC: 9.1 MG/DL (ref 8.5–10.5)
CALCIUM SERPL-MCNC: 9.2 MG/DL (ref 8.5–10.5)
CALCIUM SERPL-MCNC: 9.2 MG/DL (ref 8.5–10.5)
CALCIUM SERPL-MCNC: 9.3 MG/DL (ref 8.5–10.5)
CALCIUM SERPL-MCNC: 9.4 MG/DL (ref 8.5–10.5)
CALCIUM SERPL-MCNC: 9.6 MG/DL (ref 8.5–10.5)
CALCIUM SERPL-MCNC: 9.8 MG/DL (ref 8.5–10.5)
CASTS 2: ABNORMAL /LPF
CASTS UA: ABNORMAL /LPF
CHARACTER, URINE: CLEAR
CHARACTER,SYN.FL: ABNORMAL
CHLORIDE BLD-SCNC: 100 MEQ/L (ref 98–111)
CHLORIDE BLD-SCNC: 101 MEQ/L (ref 98–111)
CHLORIDE BLD-SCNC: 112 MEQ/L (ref 98–111)
CHLORIDE BLD-SCNC: 92 MEQ/L (ref 98–111)
CHLORIDE BLD-SCNC: 93 MEQ/L (ref 98–111)
CHLORIDE BLD-SCNC: 94 MEQ/L (ref 98–111)
CHLORIDE BLD-SCNC: 94 MEQ/L (ref 98–111)
CHLORIDE BLD-SCNC: 98 MEQ/L (ref 98–111)
CHLORIDE BLD-SCNC: 99 MEQ/L (ref 98–111)
CO2: 23 MEQ/L (ref 23–33)
CO2: 25 MEQ/L (ref 23–33)
CO2: 26 MEQ/L (ref 23–33)
CO2: 27 MEQ/L (ref 23–33)
COLOR FLUID: YELLOW
COLOR: YELLOW
CREAT SERPL-MCNC: 0.9 MG/DL (ref 0.4–1.2)
CREAT SERPL-MCNC: 1 MG/DL (ref 0.4–1.2)
CREAT SERPL-MCNC: 1 MG/DL (ref 0.4–1.2)
CREAT SERPL-MCNC: 1.1 MG/DL (ref 0.4–1.2)
CREAT SERPL-MCNC: 1.1 MG/DL (ref 0.4–1.2)
CREAT SERPL-MCNC: 1.3 MG/DL (ref 0.4–1.2)
CREAT SERPL-MCNC: 1.3 MG/DL (ref 0.4–1.2)
CREATININE, URINE: 79.5 MG/DL
CRYSTALS, FLUID: ABNORMAL
CRYSTALS, UA: ABNORMAL
EKG ATRIAL RATE: 86 BPM
EKG ATRIAL RATE: 92 BPM
EKG P AXIS: 56 DEGREES
EKG P AXIS: 78 DEGREES
EKG P-R INTERVAL: 164 MS
EKG P-R INTERVAL: 176 MS
EKG Q-T INTERVAL: 356 MS
EKG Q-T INTERVAL: 378 MS
EKG QRS DURATION: 84 MS
EKG QRS DURATION: 84 MS
EKG QTC CALCULATION (BAZETT): 440 MS
EKG QTC CALCULATION (BAZETT): 452 MS
EKG R AXIS: 34 DEGREES
EKG R AXIS: 55 DEGREES
EKG T AXIS: 46 DEGREES
EKG T AXIS: 69 DEGREES
EKG VENTRICULAR RATE: 86 BPM
EKG VENTRICULAR RATE: 92 BPM
EOSINOPHIL # BLD: 0.8 %
EOSINOPHIL # BLD: 0.9 %
EOSINOPHIL # BLD: 1.5 %
EOSINOPHIL # BLD: 1.8 %
EOSINOPHIL # BLD: 2.6 %
EOSINOPHIL # BLD: 3.1 %
EOSINOPHIL # BLD: 3.8 %
EOSINOPHIL # BLD: 4.8 %
EOSINOPHILS ABSOLUTE: 0.1 THOU/MM3 (ref 0–0.4)
EOSINOPHILS ABSOLUTE: 0.1 THOU/MM3 (ref 0–0.4)
EOSINOPHILS ABSOLUTE: 0.2 THOU/MM3 (ref 0–0.4)
EOSINOPHILS ABSOLUTE: 0.2 THOU/MM3 (ref 0–0.4)
EOSINOPHILS ABSOLUTE: 0.3 THOU/MM3 (ref 0–0.4)
EOSINOPHILS ABSOLUTE: 0.4 THOU/MM3 (ref 0–0.4)
EOSINOPHILS ABSOLUTE: 0.5 THOU/MM3 (ref 0–0.4)
EOSINOPHILS ABSOLUTE: 0.5 THOU/MM3 (ref 0–0.4)
EPITHELIAL CELLS, UA: ABNORMAL /HPF
ERYTHROCYTE [DISTWIDTH] IN BLOOD BY AUTOMATED COUNT: 15.4 % (ref 11.5–14.5)
ERYTHROCYTE [DISTWIDTH] IN BLOOD BY AUTOMATED COUNT: 15.5 % (ref 11.5–14.5)
ERYTHROCYTE [DISTWIDTH] IN BLOOD BY AUTOMATED COUNT: 15.5 % (ref 11.5–14.5)
ERYTHROCYTE [DISTWIDTH] IN BLOOD BY AUTOMATED COUNT: 15.6 % (ref 11.5–14.5)
ERYTHROCYTE [DISTWIDTH] IN BLOOD BY AUTOMATED COUNT: 15.9 % (ref 11.5–14.5)
ERYTHROCYTE [DISTWIDTH] IN BLOOD BY AUTOMATED COUNT: 15.9 % (ref 11.5–14.5)
ERYTHROCYTE [DISTWIDTH] IN BLOOD BY AUTOMATED COUNT: 17.4 % (ref 11.5–14.5)
ERYTHROCYTE [DISTWIDTH] IN BLOOD BY AUTOMATED COUNT: 20.5 % (ref 11.5–14.5)
ERYTHROCYTE [DISTWIDTH] IN BLOOD BY AUTOMATED COUNT: 20.6 % (ref 11.5–14.5)
ERYTHROCYTE [DISTWIDTH] IN BLOOD BY AUTOMATED COUNT: 45.7 FL (ref 35–45)
ERYTHROCYTE [DISTWIDTH] IN BLOOD BY AUTOMATED COUNT: 46.5 FL (ref 35–45)
ERYTHROCYTE [DISTWIDTH] IN BLOOD BY AUTOMATED COUNT: 46.7 FL (ref 35–45)
ERYTHROCYTE [DISTWIDTH] IN BLOOD BY AUTOMATED COUNT: 47.1 FL (ref 35–45)
ERYTHROCYTE [DISTWIDTH] IN BLOOD BY AUTOMATED COUNT: 48.7 FL (ref 35–45)
ERYTHROCYTE [DISTWIDTH] IN BLOOD BY AUTOMATED COUNT: 49.1 FL (ref 35–45)
ERYTHROCYTE [DISTWIDTH] IN BLOOD BY AUTOMATED COUNT: 55 FL (ref 35–45)
ERYTHROCYTE [DISTWIDTH] IN BLOOD BY AUTOMATED COUNT: 65.9 FL (ref 35–45)
ERYTHROCYTE [DISTWIDTH] IN BLOOD BY AUTOMATED COUNT: 67.9 FL (ref 35–45)
FERRITIN: 21 NG/ML (ref 10–291)
FERRITIN: 29 NG/ML (ref 10–291)
FLU A ANTIGEN: NEGATIVE
FLU B ANTIGEN: NEGATIVE
FOLATE: 10.9 NG/ML (ref 4.8–24.2)
FOLATE: 14 NG/ML (ref 4.8–24.2)
GFR SERPL CREATININE-BSD FRML MDRD: 40 ML/MIN/1.73M2
GFR SERPL CREATININE-BSD FRML MDRD: 40 ML/MIN/1.73M2
GFR SERPL CREATININE-BSD FRML MDRD: 48 ML/MIN/1.73M2
GFR SERPL CREATININE-BSD FRML MDRD: 48 ML/MIN/1.73M2
GFR SERPL CREATININE-BSD FRML MDRD: 54 ML/MIN/1.73M2
GFR SERPL CREATININE-BSD FRML MDRD: 54 ML/MIN/1.73M2
GFR SERPL CREATININE-BSD FRML MDRD: 61 ML/MIN/1.73M2
GLUCOSE BLD-MCNC: 104 MG/DL (ref 70–108)
GLUCOSE BLD-MCNC: 105 MG/DL (ref 70–108)
GLUCOSE BLD-MCNC: 109 MG/DL (ref 70–108)
GLUCOSE BLD-MCNC: 129 MG/DL (ref 70–108)
GLUCOSE BLD-MCNC: 130 MG/DL (ref 70–108)
GLUCOSE BLD-MCNC: 130 MG/DL (ref 70–108)
GLUCOSE BLD-MCNC: 131 MG/DL (ref 70–108)
GLUCOSE BLD-MCNC: 135 MG/DL (ref 70–108)
GLUCOSE BLD-MCNC: 152 MG/DL (ref 70–108)
GLUCOSE BLD-MCNC: 159 MG/DL (ref 70–108)
GLUCOSE BLD-MCNC: 161 MG/DL (ref 70–108)
GLUCOSE BLD-MCNC: 161 MG/DL (ref 70–108)
GLUCOSE BLD-MCNC: 164 MG/DL (ref 70–108)
GLUCOSE BLD-MCNC: 164 MG/DL (ref 70–108)
GLUCOSE BLD-MCNC: 165 MG/DL (ref 70–108)
GLUCOSE BLD-MCNC: 166 MG/DL (ref 70–108)
GLUCOSE BLD-MCNC: 169 MG/DL (ref 70–108)
GLUCOSE BLD-MCNC: 171 MG/DL (ref 70–108)
GLUCOSE BLD-MCNC: 173 MG/DL (ref 70–108)
GLUCOSE BLD-MCNC: 174 MG/DL (ref 70–108)
GLUCOSE BLD-MCNC: 178 MG/DL (ref 70–108)
GLUCOSE BLD-MCNC: 179 MG/DL (ref 70–108)
GLUCOSE BLD-MCNC: 188 MG/DL (ref 70–108)
GLUCOSE BLD-MCNC: 188 MG/DL (ref 70–108)
GLUCOSE BLD-MCNC: 192 MG/DL (ref 70–108)
GLUCOSE BLD-MCNC: 204 MG/DL (ref 70–108)
GLUCOSE BLD-MCNC: 212 MG/DL (ref 70–108)
GLUCOSE BLD-MCNC: 221 MG/DL (ref 70–108)
GLUCOSE BLD-MCNC: 233 MG/DL (ref 70–108)
GLUCOSE BLD-MCNC: 94 MG/DL (ref 70–108)
GLUCOSE BLD-MCNC: 95 MG/DL (ref 70–108)
GLUCOSE URINE: NEGATIVE MG/DL
GRAM STAIN RESULT: NORMAL
HBA1C MFR BLD: 8 % (ref 4.4–6.4)
HCT VFR BLD CALC: 31.3 % (ref 37–47)
HCT VFR BLD CALC: 31.8 % (ref 37–47)
HCT VFR BLD CALC: 32.2 % (ref 37–47)
HCT VFR BLD CALC: 32.6 % (ref 37–47)
HCT VFR BLD CALC: 33.1 % (ref 37–47)
HCT VFR BLD CALC: 33.7 % (ref 37–47)
HCT VFR BLD CALC: 34.4 % (ref 37–47)
HCT VFR BLD CALC: 36.4 % (ref 37–47)
HCT VFR BLD CALC: 36.6 % (ref 37–47)
HEMOGLOBIN: 10 GM/DL (ref 12–16)
HEMOGLOBIN: 10.2 GM/DL (ref 12–16)
HEMOGLOBIN: 10.6 GM/DL (ref 12–16)
HEMOGLOBIN: 11 GM/DL (ref 12–16)
HEMOGLOBIN: 9.5 GM/DL (ref 12–16)
HEMOGLOBIN: 9.6 GM/DL (ref 12–16)
HEMOGLOBIN: 9.7 GM/DL (ref 12–16)
HEMOGLOBIN: 9.7 GM/DL (ref 12–16)
HEMOGLOBIN: 9.8 GM/DL (ref 12–16)
HYPOCHROMIA: PRESENT
IMMATURE GRANS (ABS): 0.03 THOU/MM3 (ref 0–0.07)
IMMATURE GRANS (ABS): 0.04 THOU/MM3 (ref 0–0.07)
IMMATURE GRANS (ABS): 0.04 THOU/MM3 (ref 0–0.07)
IMMATURE GRANS (ABS): 0.05 THOU/MM3 (ref 0–0.07)
IMMATURE GRANS (ABS): 0.05 THOU/MM3 (ref 0–0.07)
IMMATURE GRANS (ABS): 0.07 THOU/MM3 (ref 0–0.07)
IMMATURE GRANULOCYTES: 0.2 %
IMMATURE GRANULOCYTES: 0.3 %
IMMATURE GRANULOCYTES: 0.4 %
IMMATURE GRANULOCYTES: 0.5 %
IMMATURE RETIC FRACT: 30.7 % (ref 3–15.9)
INR BLD: 1.56 (ref 0.85–1.13)
INTERPRETATION: ABNORMAL
IRON: 17 UG/DL (ref 50–170)
IRON: 33 UG/DL (ref 50–170)
KETONES, URINE: NEGATIVE
LEUKOCYTE ESTERASE, URINE: NEGATIVE
LV EF: 58 %
LVEF MODALITY: NORMAL
LYMPHOCYTES # BLD: 12.7 %
LYMPHOCYTES # BLD: 12.9 %
LYMPHOCYTES # BLD: 17.2 %
LYMPHOCYTES # BLD: 17.8 %
LYMPHOCYTES # BLD: 19.4 %
LYMPHOCYTES # BLD: 24.9 %
LYMPHOCYTES # BLD: 25.3 %
LYMPHOCYTES # BLD: 28 %
LYMPHOCYTES ABSOLUTE: 1.5 THOU/MM3 (ref 1–4.8)
LYMPHOCYTES ABSOLUTE: 1.7 THOU/MM3 (ref 1–4.8)
LYMPHOCYTES ABSOLUTE: 1.7 THOU/MM3 (ref 1–4.8)
LYMPHOCYTES ABSOLUTE: 2.1 THOU/MM3 (ref 1–4.8)
LYMPHOCYTES ABSOLUTE: 2.4 THOU/MM3 (ref 1–4.8)
LYMPHOCYTES ABSOLUTE: 2.7 THOU/MM3 (ref 1–4.8)
LYMPHOCYTES ABSOLUTE: 3.1 THOU/MM3 (ref 1–4.8)
LYMPHOCYTES ABSOLUTE: 3.4 THOU/MM3 (ref 1–4.8)
MAGNESIUM: 2 MG/DL (ref 1.6–2.4)
MAGNESIUM: 2 MG/DL (ref 1.6–2.4)
MCH RBC QN AUTO: 24.4 PG (ref 26–33)
MCH RBC QN AUTO: 24.7 PG (ref 26–33)
MCH RBC QN AUTO: 24.9 PG (ref 26–33)
MCH RBC QN AUTO: 24.9 PG (ref 26–33)
MCH RBC QN AUTO: 25.1 PG (ref 26–33)
MCH RBC QN AUTO: 25.3 PG (ref 26–33)
MCH RBC QN AUTO: 25.6 PG (ref 26–33)
MCH RBC QN AUTO: 26.9 PG (ref 26–33)
MCH RBC QN AUTO: 27.1 PG (ref 26–33)
MCHC RBC AUTO-ENTMCNC: 28.5 GM/DL (ref 32.2–35.5)
MCHC RBC AUTO-ENTMCNC: 29 GM/DL (ref 32.2–35.5)
MCHC RBC AUTO-ENTMCNC: 29.8 GM/DL (ref 32.2–35.5)
MCHC RBC AUTO-ENTMCNC: 30.1 GM/DL (ref 32.2–35.5)
MCHC RBC AUTO-ENTMCNC: 30.2 GM/DL (ref 32.2–35.5)
MCHC RBC AUTO-ENTMCNC: 30.3 GM/DL (ref 32.2–35.5)
MCHC RBC AUTO-ENTMCNC: 30.4 GM/DL (ref 32.2–35.5)
MCV RBC AUTO: 81.9 FL (ref 81–99)
MCV RBC AUTO: 82.3 FL (ref 81–99)
MCV RBC AUTO: 83 FL (ref 81–99)
MCV RBC AUTO: 83.9 FL (ref 81–99)
MCV RBC AUTO: 84.5 FL (ref 81–99)
MCV RBC AUTO: 85.9 FL (ref 81–99)
MCV RBC AUTO: 86.9 FL (ref 81–99)
MCV RBC AUTO: 88.7 FL (ref 81–99)
MCV RBC AUTO: 89.7 FL (ref 81–99)
MICROALBUMIN UR-MCNC: 6.54 MG/DL
MICROALBUMIN/CREAT UR-RTO: 82 MG/G (ref 0–30)
MISCELLANEOUS 2: ABNORMAL
MONOCYTES # BLD: 10.1 %
MONOCYTES # BLD: 12.2 %
MONOCYTES # BLD: 12.9 %
MONOCYTES # BLD: 13.2 %
MONOCYTES # BLD: 13.3 %
MONOCYTES # BLD: 14.7 %
MONOCYTES # BLD: 7.9 %
MONOCYTES # BLD: 9.9 %
MONOCYTES ABSOLUTE: 0.9 THOU/MM3 (ref 0.4–1.3)
MONOCYTES ABSOLUTE: 1.1 THOU/MM3 (ref 0.4–1.3)
MONOCYTES ABSOLUTE: 1.1 THOU/MM3 (ref 0.4–1.3)
MONOCYTES ABSOLUTE: 1.3 THOU/MM3 (ref 0.4–1.3)
MONOCYTES ABSOLUTE: 1.6 THOU/MM3 (ref 0.4–1.3)
MONOCYTES ABSOLUTE: 1.7 THOU/MM3 (ref 0.4–1.3)
MONOCYTES ABSOLUTE: 1.7 THOU/MM3 (ref 0.4–1.3)
MONOCYTES ABSOLUTE: 1.8 THOU/MM3 (ref 0.4–1.3)
MONONUCLEAR CELLS SYNOVIAL FLUID: 5 % (ref 0–74)
MRSA SCREEN RT-PCR: ABNORMAL
NITRITE, URINE: NEGATIVE
NUCLEATED RED BLOOD CELLS: 0 /100 WBC
OSMOLALITY CALCULATION: 276 MOSMOL/KG (ref 275–300)
OSMOLALITY CALCULATION: 278.3 MOSMOL/KG (ref 275–300)
OSMOLALITY CALCULATION: 280 MOSMOL/KG (ref 275–300)
OSMOLALITY: 326 MOSMOL/KG (ref 275–295)
PATHOLOGIST REVIEW: ABNORMAL
PH UA: 6.5 (ref 5–9)
PLATELET # BLD: 202 THOU/MM3 (ref 130–400)
PLATELET # BLD: 222 THOU/MM3 (ref 130–400)
PLATELET # BLD: 297 THOU/MM3 (ref 130–400)
PLATELET # BLD: 314 THOU/MM3 (ref 130–400)
PLATELET # BLD: 323 THOU/MM3 (ref 130–400)
PLATELET # BLD: 333 THOU/MM3 (ref 130–400)
PLATELET # BLD: 338 THOU/MM3 (ref 130–400)
PLATELET # BLD: 343 THOU/MM3 (ref 130–400)
PLATELET # BLD: 348 THOU/MM3 (ref 130–400)
PLATELET ESTIMATE: ADEQUATE
PMV BLD AUTO: 10 FL (ref 9.4–12.4)
PMV BLD AUTO: 10 FL (ref 9.4–12.4)
PMV BLD AUTO: 9.2 FL (ref 9.4–12.4)
PMV BLD AUTO: 9.6 FL (ref 9.4–12.4)
PMV BLD AUTO: 9.6 FL (ref 9.4–12.4)
PMV BLD AUTO: 9.7 FL (ref 9.4–12.4)
PMV BLD AUTO: 9.7 FL (ref 9.4–12.4)
PMV BLD AUTO: 9.8 FL (ref 9.4–12.4)
PMV BLD AUTO: 9.9 FL (ref 9.4–12.4)
POLYMORPHONUCLEAR CELLS SYNOVIAL FLUID: 95 % (ref 0–24)
POTASSIUM REFLEX MAGNESIUM: 4.3 MEQ/L (ref 3.5–5.2)
POTASSIUM REFLEX MAGNESIUM: 4.3 MEQ/L (ref 3.5–5.2)
POTASSIUM SERPL-SCNC: 4 MEQ/L (ref 3.5–5.2)
POTASSIUM SERPL-SCNC: 4.6 MEQ/L (ref 3.5–5.2)
POTASSIUM SERPL-SCNC: 4.6 MEQ/L (ref 3.5–5.2)
POTASSIUM SERPL-SCNC: 4.7 MEQ/L (ref 3.5–5.2)
POTASSIUM SERPL-SCNC: 4.8 MEQ/L (ref 3.5–5.2)
POTASSIUM SERPL-SCNC: 4.9 MEQ/L (ref 3.5–5.2)
POTASSIUM SERPL-SCNC: 5 MEQ/L (ref 3.5–5.2)
PRO-BNP: 284.9 PG/ML (ref 0–900)
PROCALCITONIN: 0.11 NG/ML (ref 0.01–0.09)
PROTEIN UA: 100
RBC # BLD: 3.53 MILL/MM3 (ref 4.2–5.4)
RBC # BLD: 3.83 MILL/MM3 (ref 4.2–5.4)
RBC # BLD: 3.84 MILL/MM3 (ref 4.2–5.4)
RBC # BLD: 3.96 MILL/MM3 (ref 4.2–5.4)
RBC # BLD: 3.98 MILL/MM3 (ref 4.2–5.4)
RBC # BLD: 3.99 MILL/MM3 (ref 4.2–5.4)
RBC # BLD: 4.02 MILL/MM3 (ref 4.2–5.4)
RBC # BLD: 4.06 MILL/MM3 (ref 4.2–5.4)
RBC # BLD: 4.26 MILL/MM3 (ref 4.2–5.4)
RBC URINE: ABNORMAL /HPF
RENAL EPITHELIAL, UA: ABNORMAL
RETIC HEMOGLOBIN: 27.2 PG (ref 28.2–35.7)
RETICULOCYTE ABSOLUTE COUNT: 2.1 % (ref 0.5–2)
SCAN OF BLOOD SMEAR: NORMAL
SCAN OF BLOOD SMEAR: NORMAL
SEG NEUTROPHILS: 56.1 %
SEG NEUTROPHILS: 59.5 %
SEG NEUTROPHILS: 61.8 %
SEG NEUTROPHILS: 64.4 %
SEG NEUTROPHILS: 65.2 %
SEG NEUTROPHILS: 70.6 %
SEG NEUTROPHILS: 71.7 %
SEG NEUTROPHILS: 72.3 %
SEGMENTED NEUTROPHILS ABSOLUTE COUNT: 6.1 THOU/MM3 (ref 1.8–7.7)
SEGMENTED NEUTROPHILS ABSOLUTE COUNT: 6.1 THOU/MM3 (ref 1.8–7.7)
SEGMENTED NEUTROPHILS ABSOLUTE COUNT: 6.5 THOU/MM3 (ref 1.8–7.7)
SEGMENTED NEUTROPHILS ABSOLUTE COUNT: 7.7 THOU/MM3 (ref 1.8–7.7)
SEGMENTED NEUTROPHILS ABSOLUTE COUNT: 8 THOU/MM3 (ref 1.8–7.7)
SEGMENTED NEUTROPHILS ABSOLUTE COUNT: 8.2 THOU/MM3 (ref 1.8–7.7)
SEGMENTED NEUTROPHILS ABSOLUTE COUNT: 9.3 THOU/MM3 (ref 1.8–7.7)
SEGMENTED NEUTROPHILS ABSOLUTE COUNT: 9.5 THOU/MM3 (ref 1.8–7.7)
SODIUM BLD-SCNC: 131 MEQ/L (ref 135–145)
SODIUM BLD-SCNC: 132 MEQ/L (ref 135–145)
SODIUM BLD-SCNC: 133 MEQ/L (ref 135–145)
SODIUM BLD-SCNC: 133 MEQ/L (ref 135–145)
SODIUM BLD-SCNC: 137 MEQ/L (ref 135–145)
SODIUM BLD-SCNC: 137 MEQ/L (ref 135–145)
SODIUM BLD-SCNC: 138 MEQ/L (ref 135–145)
SODIUM BLD-SCNC: 140 MEQ/L (ref 135–145)
SODIUM BLD-SCNC: 149 MEQ/L (ref 135–145)
SPECIFIC GRAVITY, URINE: 1.01 (ref 1–1.03)
T4 FREE: 1.08 NG/DL (ref 0.93–1.76)
TOTAL IRON BINDING CAPACITY: 355 UG/DL (ref 171–450)
TOTAL NUCLEATED CELLS SYNOVIAL: ABNORMAL /CUMM (ref 0–150)
TOTAL PROTEIN: 7.1 G/DL (ref 6.1–8)
TOTAL VOLUME RECEIVED SYNOVIAL: 4 ML
TROPONIN T: 0.01 NG/ML
TROPONIN T: 0.02 NG/ML
TROPONIN T: 0.02 NG/ML
TROPONIN T: 0.03 NG/ML
TROPONIN T: < 0.01 NG/ML
TSH SERPL DL<=0.05 MIU/L-ACNC: 14.25 UIU/ML (ref 0.4–4.2)
UROBILINOGEN, URINE: 0.2 EU/DL (ref 0–1)
VANCOMYCIN RESISTANT ENTEROCOCCUS: NEGATIVE
VITAMIN B-12: 484 PG/ML (ref 211–911)
VITAMIN B-12: 547 PG/ML (ref 211–911)
WBC # BLD: 10.9 THOU/MM3 (ref 4.8–10.8)
WBC # BLD: 11 THOU/MM3 (ref 4.8–10.8)
WBC # BLD: 12 THOU/MM3 (ref 4.8–10.8)
WBC # BLD: 12.3 THOU/MM3 (ref 4.8–10.8)
WBC # BLD: 13 THOU/MM3 (ref 4.8–10.8)
WBC # BLD: 13.1 THOU/MM3 (ref 4.8–10.8)
WBC # BLD: 13.3 THOU/MM3 (ref 4.8–10.8)
WBC # BLD: 8.6 THOU/MM3 (ref 4.8–10.8)
WBC # BLD: 9.1 THOU/MM3 (ref 4.8–10.8)
WBC UA: ABNORMAL /HPF
YEAST: ABNORMAL

## 2020-01-01 PROCEDURE — 99239 HOSP IP/OBS DSCHRG MGMT >30: CPT | Performed by: FAMILY MEDICINE

## 2020-01-01 PROCEDURE — 6370000000 HC RX 637 (ALT 250 FOR IP): Performed by: INTERNAL MEDICINE

## 2020-01-01 PROCEDURE — 2709999900 HC NON-CHARGEABLE SUPPLY

## 2020-01-01 PROCEDURE — 94003 VENT MGMT INPAT SUBQ DAY: CPT

## 2020-01-01 PROCEDURE — 73630 X-RAY EXAM OF FOOT: CPT

## 2020-01-01 PROCEDURE — 94770 HC ETCO2 MONITOR DAILY: CPT

## 2020-01-01 PROCEDURE — 2580000003 HC RX 258: Performed by: NURSE PRACTITIONER

## 2020-01-01 PROCEDURE — 6360000002 HC RX W HCPCS

## 2020-01-01 PROCEDURE — 80048 BASIC METABOLIC PNL TOTAL CA: CPT

## 2020-01-01 PROCEDURE — 6370000000 HC RX 637 (ALT 250 FOR IP): Performed by: NURSE PRACTITIONER

## 2020-01-01 PROCEDURE — G0378 HOSPITAL OBSERVATION PER HR: HCPCS

## 2020-01-01 PROCEDURE — 6360000002 HC RX W HCPCS: Performed by: INTERNAL MEDICINE

## 2020-01-01 PROCEDURE — 82948 REAGENT STRIP/BLOOD GLUCOSE: CPT

## 2020-01-01 PROCEDURE — 36415 COLL VENOUS BLD VENIPUNCTURE: CPT

## 2020-01-01 PROCEDURE — 6370000000 HC RX 637 (ALT 250 FOR IP): Performed by: FAMILY MEDICINE

## 2020-01-01 PROCEDURE — 94760 N-INVAS EAR/PLS OXIMETRY 1: CPT

## 2020-01-01 PROCEDURE — 93005 ELECTROCARDIOGRAM TRACING: CPT | Performed by: INTERNAL MEDICINE

## 2020-01-01 PROCEDURE — 99223 1ST HOSP IP/OBS HIGH 75: CPT | Performed by: INTERNAL MEDICINE

## 2020-01-01 PROCEDURE — 70450 CT HEAD/BRAIN W/O DYE: CPT

## 2020-01-01 PROCEDURE — 85025 COMPLETE CBC W/AUTO DIFF WBC: CPT

## 2020-01-01 PROCEDURE — 84439 ASSAY OF FREE THYROXINE: CPT

## 2020-01-01 PROCEDURE — 94640 AIRWAY INHALATION TREATMENT: CPT

## 2020-01-01 PROCEDURE — 2700000000 HC OXYGEN THERAPY PER DAY

## 2020-01-01 PROCEDURE — 99232 SBSQ HOSP IP/OBS MODERATE 35: CPT | Performed by: NURSE PRACTITIONER

## 2020-01-01 PROCEDURE — 83540 ASSAY OF IRON: CPT

## 2020-01-01 PROCEDURE — 85046 RETICYTE/HGB CONCENTRATE: CPT

## 2020-01-01 PROCEDURE — 71046 X-RAY EXAM CHEST 2 VIEWS: CPT

## 2020-01-01 PROCEDURE — 82746 ASSAY OF FOLIC ACID SERUM: CPT

## 2020-01-01 PROCEDURE — 1200000003 HC TELEMETRY R&B

## 2020-01-01 PROCEDURE — 2580000003 HC RX 258: Performed by: EMERGENCY MEDICINE

## 2020-01-01 PROCEDURE — 84145 PROCALCITONIN (PCT): CPT

## 2020-01-01 PROCEDURE — APPNB180 APP NON BILLABLE TIME > 60 MINS: Performed by: NURSE PRACTITIONER

## 2020-01-01 PROCEDURE — 87205 SMEAR GRAM STAIN: CPT

## 2020-01-01 PROCEDURE — 51702 INSERT TEMP BLADDER CATH: CPT

## 2020-01-01 PROCEDURE — 6360000004 HC RX CONTRAST MEDICATION: Performed by: EMERGENCY MEDICINE

## 2020-01-01 PROCEDURE — 2500000003 HC RX 250 WO HCPCS

## 2020-01-01 PROCEDURE — 87086 URINE CULTURE/COLONY COUNT: CPT

## 2020-01-01 PROCEDURE — 31500 INSERT EMERGENCY AIRWAY: CPT

## 2020-01-01 PROCEDURE — 99285 EMERGENCY DEPT VISIT HI MDM: CPT

## 2020-01-01 PROCEDURE — 2580000003 HC RX 258: Performed by: INTERNAL MEDICINE

## 2020-01-01 PROCEDURE — 73600 X-RAY EXAM OF ANKLE: CPT

## 2020-01-01 PROCEDURE — 73564 X-RAY EXAM KNEE 4 OR MORE: CPT

## 2020-01-01 PROCEDURE — 93970 EXTREMITY STUDY: CPT

## 2020-01-01 PROCEDURE — 93306 TTE W/DOPPLER COMPLETE: CPT

## 2020-01-01 PROCEDURE — 82728 ASSAY OF FERRITIN: CPT

## 2020-01-01 PROCEDURE — 93005 ELECTROCARDIOGRAM TRACING: CPT | Performed by: EMERGENCY MEDICINE

## 2020-01-01 PROCEDURE — 83880 ASSAY OF NATRIURETIC PEPTIDE: CPT

## 2020-01-01 PROCEDURE — 96376 TX/PRO/DX INJ SAME DRUG ADON: CPT

## 2020-01-01 PROCEDURE — 94761 N-INVAS EAR/PLS OXIMETRY MLT: CPT

## 2020-01-01 PROCEDURE — 99233 SBSQ HOSP IP/OBS HIGH 50: CPT | Performed by: FAMILY MEDICINE

## 2020-01-01 PROCEDURE — 0BH17EZ INSERTION OF ENDOTRACHEAL AIRWAY INTO TRACHEA, VIA NATURAL OR ARTIFICIAL OPENING: ICD-10-PCS | Performed by: EMERGENCY MEDICINE

## 2020-01-01 PROCEDURE — 96374 THER/PROPH/DIAG INJ IV PUSH: CPT

## 2020-01-01 PROCEDURE — 87804 INFLUENZA ASSAY W/OPTIC: CPT

## 2020-01-01 PROCEDURE — 87081 CULTURE SCREEN ONLY: CPT

## 2020-01-01 PROCEDURE — 2500000003 HC RX 250 WO HCPCS: Performed by: NURSE PRACTITIONER

## 2020-01-01 PROCEDURE — 84484 ASSAY OF TROPONIN QUANT: CPT

## 2020-01-01 PROCEDURE — 97530 THERAPEUTIC ACTIVITIES: CPT

## 2020-01-01 PROCEDURE — 89050 BODY FLUID CELL COUNT: CPT

## 2020-01-01 PROCEDURE — 83930 ASSAY OF BLOOD OSMOLALITY: CPT

## 2020-01-01 PROCEDURE — 2500000003 HC RX 250 WO HCPCS: Performed by: INTERNAL MEDICINE

## 2020-01-01 PROCEDURE — 87070 CULTURE OTHR SPECIMN AEROBIC: CPT

## 2020-01-01 PROCEDURE — 87075 CULTR BACTERIA EXCEPT BLOOD: CPT

## 2020-01-01 PROCEDURE — 99233 SBSQ HOSP IP/OBS HIGH 50: CPT | Performed by: INTERNAL MEDICINE

## 2020-01-01 PROCEDURE — 99220 PR INITIAL OBSERVATION CARE/DAY 70 MINUTES: CPT | Performed by: INTERNAL MEDICINE

## 2020-01-01 PROCEDURE — 70498 CT ANGIOGRAPHY NECK: CPT

## 2020-01-01 PROCEDURE — A9500 TC99M SESTAMIBI: HCPCS | Performed by: FAMILY MEDICINE

## 2020-01-01 PROCEDURE — 5A1945Z RESPIRATORY VENTILATION, 24-96 CONSECUTIVE HOURS: ICD-10-PCS | Performed by: EMERGENCY MEDICINE

## 2020-01-01 PROCEDURE — 83550 IRON BINDING TEST: CPT

## 2020-01-01 PROCEDURE — 70496 CT ANGIOGRAPHY HEAD: CPT

## 2020-01-01 PROCEDURE — 82607 VITAMIN B-12: CPT

## 2020-01-01 PROCEDURE — 83735 ASSAY OF MAGNESIUM: CPT

## 2020-01-01 PROCEDURE — 94002 VENT MGMT INPAT INIT DAY: CPT

## 2020-01-01 PROCEDURE — 93010 ELECTROCARDIOGRAM REPORT: CPT | Performed by: INTERNAL MEDICINE

## 2020-01-01 PROCEDURE — 89060 EXAM SYNOVIAL FLUID CRYSTALS: CPT

## 2020-01-01 PROCEDURE — 81001 URINALYSIS AUTO W/SCOPE: CPT

## 2020-01-01 PROCEDURE — 71045 X-RAY EXAM CHEST 1 VIEW: CPT

## 2020-01-01 PROCEDURE — 96365 THER/PROPH/DIAG IV INF INIT: CPT

## 2020-01-01 PROCEDURE — 6360000002 HC RX W HCPCS: Performed by: NURSE PRACTITIONER

## 2020-01-01 PROCEDURE — 93017 CV STRESS TEST TRACING ONLY: CPT | Performed by: INTERNAL MEDICINE

## 2020-01-01 PROCEDURE — 93010 ELECTROCARDIOGRAM REPORT: CPT | Performed by: NUCLEAR MEDICINE

## 2020-01-01 PROCEDURE — 6370000000 HC RX 637 (ALT 250 FOR IP)

## 2020-01-01 PROCEDURE — 2500000003 HC RX 250 WO HCPCS: Performed by: EMERGENCY MEDICINE

## 2020-01-01 PROCEDURE — 83036 HEMOGLOBIN GLYCOSYLATED A1C: CPT

## 2020-01-01 PROCEDURE — 97162 PT EVAL MOD COMPLEX 30 MIN: CPT

## 2020-01-01 PROCEDURE — 99291 CRITICAL CARE FIRST HOUR: CPT | Performed by: PSYCHIATRY & NEUROLOGY

## 2020-01-01 PROCEDURE — 85027 COMPLETE CBC AUTOMATED: CPT

## 2020-01-01 PROCEDURE — 3430000000 HC RX DIAGNOSTIC RADIOPHARMACEUTICAL: Performed by: FAMILY MEDICINE

## 2020-01-01 PROCEDURE — 87641 MR-STAPH DNA AMP PROBE: CPT

## 2020-01-01 PROCEDURE — 82043 UR ALBUMIN QUANTITATIVE: CPT

## 2020-01-01 PROCEDURE — 80053 COMPREHEN METABOLIC PANEL: CPT

## 2020-01-01 PROCEDURE — 2140000000 HC CCU INTERMEDIATE R&B

## 2020-01-01 PROCEDURE — 99226 PR SBSQ OBSERVATION CARE/DAY 35 MINUTES: CPT | Performed by: FAMILY MEDICINE

## 2020-01-01 PROCEDURE — 85610 PROTHROMBIN TIME: CPT

## 2020-01-01 PROCEDURE — 84443 ASSAY THYROID STIM HORMONE: CPT

## 2020-01-01 PROCEDURE — 78452 HT MUSCLE IMAGE SPECT MULT: CPT

## 2020-01-01 PROCEDURE — 6370000000 HC RX 637 (ALT 250 FOR IP): Performed by: PHYSICIAN ASSISTANT

## 2020-01-01 PROCEDURE — 0S9C3ZX DRAINAGE OF RIGHT KNEE JOINT, PERCUTANEOUS APPROACH, DIAGNOSTIC: ICD-10-PCS | Performed by: ORTHOPAEDIC SURGERY

## 2020-01-01 PROCEDURE — 93005 ELECTROCARDIOGRAM TRACING: CPT | Performed by: PHYSICIAN ASSISTANT

## 2020-01-01 PROCEDURE — 87500 VANOMYCIN DNA AMP PROBE: CPT

## 2020-01-01 RX ORDER — MONTELUKAST SODIUM 10 MG/1
10 TABLET ORAL NIGHTLY
Status: DISCONTINUED | OUTPATIENT
Start: 2020-01-01 | End: 2020-01-01 | Stop reason: HOSPADM

## 2020-01-01 RX ORDER — MONTELUKAST SODIUM 10 MG/1
10 TABLET ORAL NIGHTLY
Qty: 90 TABLET | Refills: 3 | Status: SHIPPED | OUTPATIENT
Start: 2020-01-01

## 2020-01-01 RX ORDER — MORPHINE SULFATE 2 MG/ML
2 INJECTION, SOLUTION INTRAMUSCULAR; INTRAVENOUS
Status: DISCONTINUED | OUTPATIENT
Start: 2020-01-01 | End: 2020-01-01 | Stop reason: HOSPADM

## 2020-01-01 RX ORDER — SODIUM CHLORIDE 0.9 % (FLUSH) 0.9 %
10 SYRINGE (ML) INJECTION PRN
Status: DISCONTINUED | OUTPATIENT
Start: 2020-01-01 | End: 2020-01-01 | Stop reason: HOSPADM

## 2020-01-01 RX ORDER — LIDOCAINE 40 MG/G
CREAM TOPICAL PRN
Status: DISCONTINUED | OUTPATIENT
Start: 2020-01-01 | End: 2020-01-01 | Stop reason: HOSPADM

## 2020-01-01 RX ORDER — MORPHINE SULFATE 2 MG/ML
INJECTION, SOLUTION INTRAMUSCULAR; INTRAVENOUS
Status: DISCONTINUED
Start: 2020-01-01 | End: 2020-01-01 | Stop reason: HOSPADM

## 2020-01-01 RX ORDER — FENTANYL CITRATE 50 UG/ML
25 INJECTION, SOLUTION INTRAMUSCULAR; INTRAVENOUS
Status: DISCONTINUED | OUTPATIENT
Start: 2020-01-01 | End: 2020-01-01 | Stop reason: HOSPADM

## 2020-01-01 RX ORDER — IPRATROPIUM BROMIDE 42 UG/1
2 SPRAY, METERED NASAL 3 TIMES DAILY
Status: DISCONTINUED | OUTPATIENT
Start: 2020-01-01 | End: 2020-01-01

## 2020-01-01 RX ORDER — DOXYCYCLINE HYCLATE 100 MG/1
100 CAPSULE ORAL 2 TIMES DAILY
Qty: 180 CAPSULE | Refills: 3 | Status: SHIPPED | OUTPATIENT
Start: 2020-01-01

## 2020-01-01 RX ORDER — ROPINIROLE 0.5 MG/1
0.5 TABLET, FILM COATED ORAL 3 TIMES DAILY
Status: DISCONTINUED | OUTPATIENT
Start: 2020-01-01 | End: 2020-01-01 | Stop reason: HOSPADM

## 2020-01-01 RX ORDER — LIDOCAINE 40 MG/G
CREAM TOPICAL
Qty: 1 TUBE | Refills: 1 | DISCHARGE
Start: 2020-01-01

## 2020-01-01 RX ORDER — LEVOTHYROXINE SODIUM 88 UG/1
88 TABLET ORAL DAILY
Status: DISCONTINUED | OUTPATIENT
Start: 2020-01-01 | End: 2020-01-01 | Stop reason: HOSPADM

## 2020-01-01 RX ORDER — ROCURONIUM BROMIDE 10 MG/ML
INJECTION, SOLUTION INTRAVENOUS
Status: DISPENSED
Start: 2020-01-01 | End: 2020-01-01

## 2020-01-01 RX ORDER — CHLORHEXIDINE GLUCONATE 0.12 MG/ML
15 RINSE ORAL 2 TIMES DAILY
Status: DISCONTINUED | OUTPATIENT
Start: 2020-01-01 | End: 2020-01-01

## 2020-01-01 RX ORDER — SODIUM CHLORIDE 0.9 % (FLUSH) 0.9 %
10 SYRINGE (ML) INJECTION EVERY 12 HOURS SCHEDULED
Status: DISCONTINUED | OUTPATIENT
Start: 2020-01-01 | End: 2020-01-01 | Stop reason: HOSPADM

## 2020-01-01 RX ORDER — DILTIAZEM HYDROCHLORIDE 120 MG/1
120 CAPSULE, COATED, EXTENDED RELEASE ORAL 2 TIMES DAILY
Qty: 180 CAPSULE | Refills: 3 | Status: SHIPPED | OUTPATIENT
Start: 2020-01-01

## 2020-01-01 RX ORDER — MANNITOL 20 G/100ML
50 INJECTION, SOLUTION INTRAVENOUS EVERY 8 HOURS
Status: DISCONTINUED | OUTPATIENT
Start: 2020-01-01 | End: 2020-01-01

## 2020-01-01 RX ORDER — METOPROLOL TARTRATE 50 MG/1
50 TABLET, FILM COATED ORAL 2 TIMES DAILY
Status: DISCONTINUED | OUTPATIENT
Start: 2020-01-01 | End: 2020-01-01 | Stop reason: HOSPADM

## 2020-01-01 RX ORDER — IPRATROPIUM BROMIDE AND ALBUTEROL SULFATE 2.5; .5 MG/3ML; MG/3ML
1 SOLUTION RESPIRATORY (INHALATION)
Status: DISCONTINUED | OUTPATIENT
Start: 2020-01-01 | End: 2020-01-01

## 2020-01-01 RX ORDER — FENTANYL CITRATE 50 UG/ML
25 INJECTION, SOLUTION INTRAMUSCULAR; INTRAVENOUS
Status: DISCONTINUED | OUTPATIENT
Start: 2020-01-01 | End: 2020-01-01

## 2020-01-01 RX ORDER — FLUTICASONE PROPIONATE 110 UG/1
2 AEROSOL, METERED RESPIRATORY (INHALATION) 2 TIMES DAILY
Status: DISCONTINUED | OUTPATIENT
Start: 2020-01-01 | End: 2020-01-01 | Stop reason: ALTCHOICE

## 2020-01-01 RX ORDER — ROCURONIUM BROMIDE 10 MG/ML
INJECTION, SOLUTION INTRAVENOUS DAILY PRN
Status: DISCONTINUED | OUTPATIENT
Start: 2020-01-01 | End: 2020-01-01

## 2020-01-01 RX ORDER — NICOTINE POLACRILEX 4 MG
15 LOZENGE BUCCAL PRN
Status: DISCONTINUED | OUTPATIENT
Start: 2020-01-01 | End: 2020-01-01 | Stop reason: HOSPADM

## 2020-01-01 RX ORDER — MAGNESIUM CHLORIDE 64 MG
1 TABLET, DELAYED RELEASE (ENTERIC COATED) ORAL 2 TIMES DAILY WITH MEALS
Status: DISCONTINUED | OUTPATIENT
Start: 2020-01-01 | End: 2020-01-01 | Stop reason: HOSPADM

## 2020-01-01 RX ORDER — MAGNESIUM CHLORIDE 64 MG
1 TABLET, DELAYED RELEASE (ENTERIC COATED) ORAL 2 TIMES DAILY WITH MEALS
Qty: 180 TABLET | Refills: 3 | Status: SHIPPED | OUTPATIENT
Start: 2020-01-01

## 2020-01-01 RX ORDER — IPRATROPIUM BROMIDE AND ALBUTEROL SULFATE 2.5; .5 MG/3ML; MG/3ML
1 SOLUTION RESPIRATORY (INHALATION) EVERY 4 HOURS PRN
Status: DISCONTINUED | OUTPATIENT
Start: 2020-01-01 | End: 2020-01-01 | Stop reason: HOSPADM

## 2020-01-01 RX ORDER — METHYLPREDNISOLONE SODIUM SUCCINATE 40 MG/ML
40 INJECTION, POWDER, LYOPHILIZED, FOR SOLUTION INTRAMUSCULAR; INTRAVENOUS DAILY
Status: DISCONTINUED | OUTPATIENT
Start: 2020-01-01 | End: 2020-01-01

## 2020-01-01 RX ORDER — MANNITOL 20 G/100ML
100 INJECTION, SOLUTION INTRAVENOUS ONCE
Status: COMPLETED | OUTPATIENT
Start: 2020-01-01 | End: 2020-01-01

## 2020-01-01 RX ORDER — BUDESONIDE AND FORMOTEROL FUMARATE DIHYDRATE 160; 4.5 UG/1; UG/1
2 AEROSOL RESPIRATORY (INHALATION) 2 TIMES DAILY
Status: DISCONTINUED | OUTPATIENT
Start: 2020-01-01 | End: 2020-01-01

## 2020-01-01 RX ORDER — IPRATROPIUM BROMIDE 42 UG/1
2 SPRAY, METERED NASAL 3 TIMES DAILY
Qty: 3 BOTTLE | Refills: 3 | Status: SHIPPED | OUTPATIENT
Start: 2020-01-01

## 2020-01-01 RX ORDER — OXYBUTYNIN CHLORIDE 5 MG/1
5 TABLET ORAL DAILY
Status: DISCONTINUED | OUTPATIENT
Start: 2020-01-01 | End: 2020-01-01 | Stop reason: HOSPADM

## 2020-01-01 RX ORDER — ROPINIROLE 1 MG/1
1 TABLET, FILM COATED ORAL 2 TIMES DAILY
Qty: 180 TABLET | Refills: 3 | Status: SHIPPED | OUTPATIENT
Start: 2020-01-01

## 2020-01-01 RX ORDER — SODIUM CHLORIDE 9 MG/ML
INJECTION, SOLUTION INTRAVENOUS CONTINUOUS
Status: DISCONTINUED | OUTPATIENT
Start: 2020-01-01 | End: 2020-01-01

## 2020-01-01 RX ORDER — FENTANYL CITRATE 50 UG/ML
50 INJECTION, SOLUTION INTRAMUSCULAR; INTRAVENOUS
Status: DISCONTINUED | OUTPATIENT
Start: 2020-01-01 | End: 2020-01-01 | Stop reason: HOSPADM

## 2020-01-01 RX ORDER — ACETAMINOPHEN 650 MG/1
650 SUPPOSITORY RECTAL EVERY 6 HOURS PRN
Status: DISCONTINUED | OUTPATIENT
Start: 2020-01-01 | End: 2020-01-01 | Stop reason: HOSPADM

## 2020-01-01 RX ORDER — CAPSAICIN 0.025 %
CREAM (GRAM) TOPICAL
Qty: 1 TUBE | Refills: 1 | DISCHARGE
Start: 2020-01-01 | End: 2020-01-01

## 2020-01-01 RX ORDER — FUROSEMIDE 20 MG/1
20 TABLET ORAL DAILY
Status: DISCONTINUED | OUTPATIENT
Start: 2020-01-01 | End: 2020-01-01

## 2020-01-01 RX ORDER — ONDANSETRON 2 MG/ML
4 INJECTION INTRAMUSCULAR; INTRAVENOUS EVERY 6 HOURS PRN
Status: DISCONTINUED | OUTPATIENT
Start: 2020-01-01 | End: 2020-01-01 | Stop reason: HOSPADM

## 2020-01-01 RX ORDER — METOPROLOL TARTRATE 5 MG/5ML
5 INJECTION INTRAVENOUS EVERY 6 HOURS
Status: DISCONTINUED | OUTPATIENT
Start: 2020-01-01 | End: 2020-01-01

## 2020-01-01 RX ORDER — ASPIRIN 81 MG/1
324 TABLET, CHEWABLE ORAL ONCE
Status: COMPLETED | OUTPATIENT
Start: 2020-01-01 | End: 2020-01-01

## 2020-01-01 RX ORDER — LEVALBUTEROL INHALATION SOLUTION 1.25 MG/3ML
1 SOLUTION RESPIRATORY (INHALATION) EVERY 8 HOURS PRN
Qty: 3 ML | Refills: 3 | Status: SHIPPED | OUTPATIENT
Start: 2020-01-01 | End: 2020-01-01 | Stop reason: SDUPTHER

## 2020-01-01 RX ORDER — LEVOTHYROXINE SODIUM 0.1 MG/1
100 TABLET ORAL DAILY
Status: DISCONTINUED | OUTPATIENT
Start: 2020-01-01 | End: 2020-01-01

## 2020-01-01 RX ORDER — SODIUM CHLORIDE 9 MG/ML
50 INJECTION, SOLUTION INTRAVENOUS ONCE
Status: DISCONTINUED | OUTPATIENT
Start: 2020-01-01 | End: 2020-01-01 | Stop reason: ALTCHOICE

## 2020-01-01 RX ORDER — SODIUM HYPOCHLORITE 1.25 MG/ML
SOLUTION TOPICAL DAILY
Status: DISCONTINUED | OUTPATIENT
Start: 2020-01-01 | End: 2020-01-01

## 2020-01-01 RX ORDER — SODIUM CHLORIDE 450 MG/100ML
INJECTION, SOLUTION INTRAVENOUS CONTINUOUS
Status: DISCONTINUED | OUTPATIENT
Start: 2020-01-01 | End: 2020-01-01

## 2020-01-01 RX ORDER — LOSARTAN POTASSIUM 100 MG/1
100 TABLET ORAL DAILY
Qty: 90 TABLET | Refills: 3 | Status: SHIPPED | OUTPATIENT
Start: 2020-01-01

## 2020-01-01 RX ORDER — LIDOCAINE HYDROCHLORIDE 10 MG/ML
5 INJECTION, SOLUTION INFILTRATION; PERINEURAL ONCE
Status: DISCONTINUED | OUTPATIENT
Start: 2020-01-01 | End: 2020-01-01 | Stop reason: HOSPADM

## 2020-01-01 RX ORDER — ACETAMINOPHEN 325 MG/1
650 TABLET ORAL EVERY 4 HOURS PRN
Status: DISCONTINUED | OUTPATIENT
Start: 2020-01-01 | End: 2020-01-01 | Stop reason: HOSPADM

## 2020-01-01 RX ORDER — PROPOFOL 10 MG/ML
INJECTION, EMULSION INTRAVENOUS
Status: DISCONTINUED
Start: 2020-01-01 | End: 2020-01-01 | Stop reason: WASHOUT

## 2020-01-01 RX ORDER — ACETAMINOPHEN 325 MG/1
650 TABLET ORAL EVERY 6 HOURS PRN
Status: DISCONTINUED | OUTPATIENT
Start: 2020-01-01 | End: 2020-01-01 | Stop reason: HOSPADM

## 2020-01-01 RX ORDER — LEVALBUTEROL INHALATION SOLUTION 1.25 MG/3ML
1 SOLUTION RESPIRATORY (INHALATION) EVERY 8 HOURS PRN
Qty: 90 ML | Refills: 11 | Status: SHIPPED | OUTPATIENT
Start: 2020-01-01

## 2020-01-01 RX ORDER — GUAIFENESIN 600 MG/1
1200 TABLET, EXTENDED RELEASE ORAL 2 TIMES DAILY
Status: DISCONTINUED | OUTPATIENT
Start: 2020-01-01 | End: 2020-01-01

## 2020-01-01 RX ORDER — PROPOFOL 10 MG/ML
INJECTION, EMULSION INTRAVENOUS
Status: COMPLETED
Start: 2020-01-01 | End: 2020-01-01

## 2020-01-01 RX ORDER — IPRATROPIUM BROMIDE 42 UG/1
2 SPRAY, METERED NASAL 3 TIMES DAILY
Status: DISCONTINUED | OUTPATIENT
Start: 2020-01-01 | End: 2020-01-01 | Stop reason: HOSPADM

## 2020-01-01 RX ORDER — POLYETHYLENE GLYCOL 3350 17 G/17G
17 POWDER, FOR SOLUTION ORAL DAILY PRN
Status: DISCONTINUED | OUTPATIENT
Start: 2020-01-01 | End: 2020-01-01

## 2020-01-01 RX ORDER — MANNITOL 250 MG/ML
50 INJECTION, SOLUTION INTRAVENOUS EVERY 8 HOURS
Status: DISCONTINUED | OUTPATIENT
Start: 2020-01-01 | End: 2020-01-01

## 2020-01-01 RX ORDER — LEVOTHYROXINE SODIUM 0.1 MG/1
100 TABLET ORAL DAILY
Qty: 30 TABLET | Refills: 5 | Status: SHIPPED | OUTPATIENT
Start: 2020-01-01

## 2020-01-01 RX ORDER — ETOMIDATE 2 MG/ML
INJECTION INTRAVENOUS DAILY PRN
Status: DISCONTINUED | OUTPATIENT
Start: 2020-01-01 | End: 2020-01-01

## 2020-01-01 RX ORDER — FLUTICASONE PROPIONATE 110 UG/1
2 AEROSOL, METERED RESPIRATORY (INHALATION) 2 TIMES DAILY
Status: DISCONTINUED | OUTPATIENT
Start: 2020-01-01 | End: 2020-01-01 | Stop reason: HOSPADM

## 2020-01-01 RX ORDER — FUROSEMIDE 10 MG/ML
20 INJECTION INTRAMUSCULAR; INTRAVENOUS DAILY
Status: DISCONTINUED | OUTPATIENT
Start: 2020-01-01 | End: 2020-01-01 | Stop reason: HOSPADM

## 2020-01-01 RX ORDER — INSULIN GLARGINE 100 [IU]/ML
48 INJECTION, SOLUTION SUBCUTANEOUS
Status: DISCONTINUED | OUTPATIENT
Start: 2020-01-01 | End: 2020-01-01 | Stop reason: HOSPADM

## 2020-01-01 RX ORDER — OXYBUTYNIN CHLORIDE 5 MG/1
5 TABLET ORAL DAILY
Qty: 90 TABLET | Refills: 3 | Status: SHIPPED | OUTPATIENT
Start: 2020-01-01

## 2020-01-01 RX ORDER — SODIUM HYPOCHLORITE 1.25 MG/ML
SOLUTION TOPICAL DAILY
Status: DISCONTINUED | OUTPATIENT
Start: 2020-01-01 | End: 2020-01-01 | Stop reason: HOSPADM

## 2020-01-01 RX ORDER — MORPHINE SULFATE 4 MG/ML
4 INJECTION, SOLUTION INTRAMUSCULAR; INTRAVENOUS
Status: DISCONTINUED | OUTPATIENT
Start: 2020-01-01 | End: 2020-01-01 | Stop reason: HOSPADM

## 2020-01-01 RX ORDER — NICOTINE POLACRILEX 4 MG
15 LOZENGE BUCCAL PRN
Status: DISCONTINUED | OUTPATIENT
Start: 2020-01-01 | End: 2020-01-01

## 2020-01-01 RX ORDER — FAMOTIDINE 20 MG/1
10 TABLET, FILM COATED ORAL 2 TIMES DAILY
Status: DISCONTINUED | OUTPATIENT
Start: 2020-01-01 | End: 2020-01-01 | Stop reason: HOSPADM

## 2020-01-01 RX ORDER — LIDOCAINE HYDROCHLORIDE 10 MG/ML
INJECTION, SOLUTION EPIDURAL; INFILTRATION; INTRACAUDAL; PERINEURAL
Status: COMPLETED
Start: 2020-01-01 | End: 2020-01-01

## 2020-01-01 RX ORDER — DILTIAZEM HYDROCHLORIDE 120 MG/1
120 CAPSULE, COATED, EXTENDED RELEASE ORAL 2 TIMES DAILY
Status: DISCONTINUED | OUTPATIENT
Start: 2020-01-01 | End: 2020-01-01 | Stop reason: HOSPADM

## 2020-01-01 RX ORDER — DEXTROSE MONOHYDRATE 25 G/50ML
12.5 INJECTION, SOLUTION INTRAVENOUS PRN
Status: DISCONTINUED | OUTPATIENT
Start: 2020-01-01 | End: 2020-01-01 | Stop reason: HOSPADM

## 2020-01-01 RX ORDER — PANTOPRAZOLE SODIUM 40 MG/1
40 TABLET, DELAYED RELEASE ORAL
Status: DISCONTINUED | OUTPATIENT
Start: 2020-01-01 | End: 2020-01-01

## 2020-01-01 RX ORDER — TRAMADOL HYDROCHLORIDE 50 MG/1
50 TABLET ORAL EVERY 12 HOURS PRN
Status: DISCONTINUED | OUTPATIENT
Start: 2020-01-01 | End: 2020-01-01 | Stop reason: HOSPADM

## 2020-01-01 RX ORDER — TRAMADOL HYDROCHLORIDE 50 MG/1
50 TABLET ORAL ONCE
Status: COMPLETED | OUTPATIENT
Start: 2020-01-01 | End: 2020-01-01

## 2020-01-01 RX ORDER — PROMETHAZINE HYDROCHLORIDE 25 MG/1
12.5 TABLET ORAL EVERY 6 HOURS PRN
Status: DISCONTINUED | OUTPATIENT
Start: 2020-01-01 | End: 2020-01-01 | Stop reason: HOSPADM

## 2020-01-01 RX ORDER — METHYLPREDNISOLONE ACETATE 80 MG/ML
80 INJECTION, SUSPENSION INTRA-ARTICULAR; INTRALESIONAL; INTRAMUSCULAR; SOFT TISSUE ONCE
Status: COMPLETED | OUTPATIENT
Start: 2020-01-01 | End: 2020-01-01

## 2020-01-01 RX ORDER — CAPSAICIN 0.025 %
CREAM (GRAM) TOPICAL 2 TIMES DAILY
Status: DISCONTINUED | OUTPATIENT
Start: 2020-01-01 | End: 2020-01-01 | Stop reason: HOSPADM

## 2020-01-01 RX ORDER — ONDANSETRON 2 MG/ML
4 INJECTION INTRAMUSCULAR; INTRAVENOUS EVERY 6 HOURS PRN
Status: DISCONTINUED | OUTPATIENT
Start: 2020-01-01 | End: 2020-01-01

## 2020-01-01 RX ORDER — AZELASTINE HYDROCHLORIDE 137 UG/1
2 SPRAY, METERED NASAL DAILY
Status: DISCONTINUED | OUTPATIENT
Start: 2020-01-01 | End: 2020-01-01 | Stop reason: RX

## 2020-01-01 RX ORDER — FERROUS SULFATE 325(65) MG
325 TABLET ORAL 2 TIMES DAILY WITH MEALS
Qty: 180 TABLET | Refills: 1 | Status: SHIPPED | OUTPATIENT
Start: 2020-01-01

## 2020-01-01 RX ORDER — DEXTROSE MONOHYDRATE 25 G/50ML
12.5 INJECTION, SOLUTION INTRAVENOUS PRN
Status: DISCONTINUED | OUTPATIENT
Start: 2020-01-01 | End: 2020-01-01

## 2020-01-01 RX ORDER — ROPINIROLE 0.5 MG/1
0.5 TABLET, FILM COATED ORAL 2 TIMES DAILY
Qty: 180 TABLET | Refills: 3 | Status: SHIPPED | OUTPATIENT
Start: 2020-01-01 | End: 2020-01-01 | Stop reason: SDUPTHER

## 2020-01-01 RX ORDER — ALBUTEROL SULFATE 2.5 MG/3ML
1.25 SOLUTION RESPIRATORY (INHALATION) 3 TIMES DAILY PRN
Status: DISCONTINUED | OUTPATIENT
Start: 2020-01-01 | End: 2020-01-01 | Stop reason: HOSPADM

## 2020-01-01 RX ORDER — PROPOFOL 10 MG/ML
10 INJECTION, EMULSION INTRAVENOUS
Status: DISCONTINUED | OUTPATIENT
Start: 2020-01-01 | End: 2020-01-01

## 2020-01-01 RX ORDER — DEXTROSE MONOHYDRATE 50 MG/ML
100 INJECTION, SOLUTION INTRAVENOUS PRN
Status: DISCONTINUED | OUTPATIENT
Start: 2020-01-01 | End: 2020-01-01

## 2020-01-01 RX ORDER — GLYCOPYRROLATE 1 MG/5 ML
0.2 SYRINGE (ML) INTRAVENOUS EVERY 4 HOURS PRN
Status: DISCONTINUED | OUTPATIENT
Start: 2020-01-01 | End: 2020-01-01 | Stop reason: HOSPADM

## 2020-01-01 RX ORDER — LATANOPROST 50 UG/ML
1 SOLUTION/ DROPS OPHTHALMIC DAILY
Status: DISCONTINUED | OUTPATIENT
Start: 2020-01-01 | End: 2020-01-01 | Stop reason: HOSPADM

## 2020-01-01 RX ORDER — BENZONATATE 100 MG/1
200 CAPSULE ORAL 3 TIMES DAILY PRN
Status: DISCONTINUED | OUTPATIENT
Start: 2020-01-01 | End: 2020-01-01 | Stop reason: HOSPADM

## 2020-01-01 RX ORDER — PRASUGREL 10 MG/1
10 TABLET, FILM COATED ORAL DAILY
Status: DISCONTINUED | OUTPATIENT
Start: 2020-01-01 | End: 2020-01-01 | Stop reason: HOSPADM

## 2020-01-01 RX ORDER — PRASUGREL 10 MG/1
10 TABLET, FILM COATED ORAL DAILY
Qty: 90 TABLET | Refills: 3 | Status: SHIPPED | OUTPATIENT
Start: 2020-01-01

## 2020-01-01 RX ORDER — BUDESONIDE AND FORMOTEROL FUMARATE DIHYDRATE 160; 4.5 UG/1; UG/1
2 AEROSOL RESPIRATORY (INHALATION) 2 TIMES DAILY
Status: DISCONTINUED | OUTPATIENT
Start: 2020-01-01 | End: 2020-01-01 | Stop reason: CLARIF

## 2020-01-01 RX ORDER — TRAMADOL HYDROCHLORIDE 50 MG/1
50 TABLET ORAL EVERY 12 HOURS PRN
Qty: 10 TABLET | Refills: 0 | Status: SHIPPED | OUTPATIENT
Start: 2020-01-01 | End: 2020-01-01

## 2020-01-01 RX ORDER — ORPHENADRINE CITRATE 100 MG/1
100 TABLET, EXTENDED RELEASE ORAL 2 TIMES DAILY PRN
Status: DISCONTINUED | OUTPATIENT
Start: 2020-01-01 | End: 2020-01-01 | Stop reason: HOSPADM

## 2020-01-01 RX ORDER — LORAZEPAM 2 MG/ML
1 INJECTION INTRAMUSCULAR EVERY 4 HOURS PRN
Status: DISCONTINUED | OUTPATIENT
Start: 2020-01-01 | End: 2020-01-01 | Stop reason: HOSPADM

## 2020-01-01 RX ORDER — DEXTROSE MONOHYDRATE 50 MG/ML
100 INJECTION, SOLUTION INTRAVENOUS PRN
Status: DISCONTINUED | OUTPATIENT
Start: 2020-01-01 | End: 2020-01-01 | Stop reason: HOSPADM

## 2020-01-01 RX ADMIN — BUDESONIDE AND FORMOTEROL FUMARATE DIHYDRATE 2 PUFF: 160; 4.5 AEROSOL RESPIRATORY (INHALATION) at 23:42

## 2020-01-01 RX ADMIN — ROPINIROLE HYDROCHLORIDE 0.5 MG: 0.5 TABLET, FILM COATED ORAL at 09:34

## 2020-01-01 RX ADMIN — MONTELUKAST SODIUM 10 MG: 10 TABLET, FILM COATED ORAL at 20:02

## 2020-01-01 RX ADMIN — MAGNESIUM 64 MG (MAGNESIUM CHLORIDE) TABLET,DELAYED RELEASE 64 MG: at 18:12

## 2020-01-01 RX ADMIN — Medication 15 ML: at 08:41

## 2020-01-01 RX ADMIN — PROPOFOL 10 MCG/KG/MIN: 10 INJECTION, EMULSION INTRAVENOUS at 20:14

## 2020-01-01 RX ADMIN — Medication 15 ML: at 09:10

## 2020-01-01 RX ADMIN — FUROSEMIDE 20 MG: 40 INJECTION, SOLUTION INTRAMUSCULAR; INTRAVENOUS at 09:20

## 2020-01-01 RX ADMIN — LEVOTHYROXINE SODIUM 88 MCG: 88 TABLET ORAL at 14:19

## 2020-01-01 RX ADMIN — PANTOPRAZOLE SODIUM 40 MG: 40 TABLET, DELAYED RELEASE ORAL at 10:11

## 2020-01-01 RX ADMIN — INSULIN LISPRO 1 UNITS: 100 INJECTION, SOLUTION INTRAVENOUS; SUBCUTANEOUS at 09:44

## 2020-01-01 RX ADMIN — SODIUM CHLORIDE, PRESERVATIVE FREE 10 ML: 5 INJECTION INTRAVENOUS at 22:36

## 2020-01-01 RX ADMIN — Medication 5 MCG/MIN: at 03:39

## 2020-01-01 RX ADMIN — RIVAROXABAN 15 MG: 15 TABLET, FILM COATED ORAL at 10:12

## 2020-01-01 RX ADMIN — INSULIN LISPRO 1 UNITS: 100 INJECTION, SOLUTION INTRAVENOUS; SUBCUTANEOUS at 18:09

## 2020-01-01 RX ADMIN — FAMOTIDINE 20 MG: 10 INJECTION INTRAVENOUS at 20:51

## 2020-01-01 RX ADMIN — ETOMIDATE 40 MG: 2 INJECTION INTRAVENOUS at 19:58

## 2020-01-01 RX ADMIN — ROPINIROLE HYDROCHLORIDE 0.5 MG: 0.5 TABLET, FILM COATED ORAL at 10:39

## 2020-01-01 RX ADMIN — ROPINIROLE HYDROCHLORIDE 0.5 MG: 0.5 TABLET, FILM COATED ORAL at 14:19

## 2020-01-01 RX ADMIN — GUAIFENESIN 1200 MG: 600 TABLET, EXTENDED RELEASE ORAL at 10:12

## 2020-01-01 RX ADMIN — ORPHENADRINE CITRATE 100 MG: 100 TABLET, EXTENDED RELEASE ORAL at 20:06

## 2020-01-01 RX ADMIN — INSULIN LISPRO 1 UNITS: 100 INJECTION, SOLUTION INTRAVENOUS; SUBCUTANEOUS at 09:48

## 2020-01-01 RX ADMIN — Medication 2 PUFF: at 05:48

## 2020-01-01 RX ADMIN — FUROSEMIDE 20 MG: 40 INJECTION, SOLUTION INTRAMUSCULAR; INTRAVENOUS at 01:20

## 2020-01-01 RX ADMIN — MAGNESIUM 64 MG (MAGNESIUM CHLORIDE) TABLET,DELAYED RELEASE 64 MG: at 10:11

## 2020-01-01 RX ADMIN — IPRATROPIUM BROMIDE 2 SPRAY: 42 SPRAY NASAL at 09:48

## 2020-01-01 RX ADMIN — AMIODARONE HYDROCHLORIDE 0.5 MG/MIN: 1.8 INJECTION, SOLUTION INTRAVENOUS at 09:52

## 2020-01-01 RX ADMIN — MAGNESIUM 64 MG (MAGNESIUM CHLORIDE) TABLET,DELAYED RELEASE 64 MG: at 09:46

## 2020-01-01 RX ADMIN — CAPSAICIN: 0.25 CREAM TOPICAL at 09:48

## 2020-01-01 RX ADMIN — Medication 30 MCG/MIN: at 04:17

## 2020-01-01 RX ADMIN — ACETAMINOPHEN 650 MG: 325 TABLET ORAL at 03:24

## 2020-01-01 RX ADMIN — SODIUM CHLORIDE, PRESERVATIVE FREE 10 ML: 5 INJECTION INTRAVENOUS at 09:27

## 2020-01-01 RX ADMIN — Medication 2 PUFF: at 20:29

## 2020-01-01 RX ADMIN — INSULIN LISPRO 1 UNITS: 100 INJECTION, SOLUTION INTRAVENOUS; SUBCUTANEOUS at 00:56

## 2020-01-01 RX ADMIN — INSULIN LISPRO 8 UNITS: 100 INJECTION, SOLUTION INTRAVENOUS; SUBCUTANEOUS at 09:41

## 2020-01-01 RX ADMIN — LATANOPROST 1 DROP: 50 SOLUTION OPHTHALMIC at 10:11

## 2020-01-01 RX ADMIN — INSULIN LISPRO 4 UNITS: 100 INJECTION, SOLUTION INTRAVENOUS; SUBCUTANEOUS at 09:53

## 2020-01-01 RX ADMIN — SODIUM CHLORIDE, PRESERVATIVE FREE 10 ML: 5 INJECTION INTRAVENOUS at 23:02

## 2020-01-01 RX ADMIN — INSULIN LISPRO 8 UNITS: 100 INJECTION, SOLUTION INTRAVENOUS; SUBCUTANEOUS at 18:47

## 2020-01-01 RX ADMIN — METOPROLOL TARTRATE 50 MG: 50 TABLET, FILM COATED ORAL at 10:12

## 2020-01-01 RX ADMIN — GUAIFENESIN 1200 MG: 600 TABLET, EXTENDED RELEASE ORAL at 10:39

## 2020-01-01 RX ADMIN — SODIUM CHLORIDE: 4.5 INJECTION, SOLUTION INTRAVENOUS at 05:03

## 2020-01-01 RX ADMIN — ROPINIROLE HYDROCHLORIDE 0.5 MG: 0.5 TABLET, FILM COATED ORAL at 01:06

## 2020-01-01 RX ADMIN — Medication 2 PUFF: at 08:31

## 2020-01-01 RX ADMIN — INSULIN LISPRO 1 UNITS: 100 INJECTION, SOLUTION INTRAVENOUS; SUBCUTANEOUS at 13:54

## 2020-01-01 RX ADMIN — INSULIN LISPRO 8 UNITS: 100 INJECTION, SOLUTION INTRAVENOUS; SUBCUTANEOUS at 14:20

## 2020-01-01 RX ADMIN — ROPINIROLE HYDROCHLORIDE 0.5 MG: 0.5 TABLET, FILM COATED ORAL at 09:48

## 2020-01-01 RX ADMIN — SODIUM CHLORIDE: 9 INJECTION, SOLUTION INTRAVENOUS at 12:37

## 2020-01-01 RX ADMIN — Medication 2 PUFF: at 17:17

## 2020-01-01 RX ADMIN — DEXTROSE MONOHYDRATE 5 MG/HR: 50 INJECTION, SOLUTION INTRAVENOUS at 01:43

## 2020-01-01 RX ADMIN — ROPINIROLE HYDROCHLORIDE 0.5 MG: 0.5 TABLET, FILM COATED ORAL at 23:01

## 2020-01-01 RX ADMIN — INSULIN LISPRO 4 UNITS: 100 INJECTION, SOLUTION INTRAVENOUS; SUBCUTANEOUS at 05:06

## 2020-01-01 RX ADMIN — INSULIN LISPRO 1 UNITS: 100 INJECTION, SOLUTION INTRAVENOUS; SUBCUTANEOUS at 18:47

## 2020-01-01 RX ADMIN — GUAIFENESIN 1200 MG: 600 TABLET, EXTENDED RELEASE ORAL at 22:35

## 2020-01-01 RX ADMIN — PRASUGREL 10 MG: 10 TABLET, FILM COATED ORAL at 10:12

## 2020-01-01 RX ADMIN — LATANOPROST 1 DROP: 50 SOLUTION OPHTHALMIC at 09:19

## 2020-01-01 RX ADMIN — LINAGLIPTIN 5 MG: 5 TABLET, FILM COATED ORAL at 18:49

## 2020-01-01 RX ADMIN — TRAMADOL HYDROCHLORIDE 50 MG: 50 TABLET, FILM COATED ORAL at 10:43

## 2020-01-01 RX ADMIN — BUDESONIDE AND FORMOTEROL FUMARATE DIHYDRATE 2 PUFF: 160; 4.5 AEROSOL RESPIRATORY (INHALATION) at 10:53

## 2020-01-01 RX ADMIN — Medication 31 MILLICURIE: at 13:50

## 2020-01-01 RX ADMIN — MAGNESIUM 64 MG (MAGNESIUM CHLORIDE) TABLET,DELAYED RELEASE 64 MG: at 09:35

## 2020-01-01 RX ADMIN — INSULIN LISPRO 8 UNITS: 100 INJECTION, SOLUTION INTRAVENOUS; SUBCUTANEOUS at 09:48

## 2020-01-01 RX ADMIN — TRAMADOL HYDROCHLORIDE 50 MG: 50 TABLET, FILM COATED ORAL at 17:30

## 2020-01-01 RX ADMIN — FUROSEMIDE 20 MG: 40 INJECTION, SOLUTION INTRAMUSCULAR; INTRAVENOUS at 10:11

## 2020-01-01 RX ADMIN — LINAGLIPTIN 5 MG: 5 TABLET, FILM COATED ORAL at 09:48

## 2020-01-01 RX ADMIN — LINAGLIPTIN 5 MG: 5 TABLET, FILM COATED ORAL at 10:12

## 2020-01-01 RX ADMIN — IPRATROPIUM BROMIDE 2 SPRAY: 42 SPRAY NASAL at 10:11

## 2020-01-01 RX ADMIN — IPRATROPIUM BROMIDE 2 SPRAY: 42 SPRAY NASAL at 09:19

## 2020-01-01 RX ADMIN — RIVAROXABAN 15 MG: 15 TABLET, FILM COATED ORAL at 09:34

## 2020-01-01 RX ADMIN — LEVOTHYROXINE SODIUM 88 MCG: 88 TABLET ORAL at 10:12

## 2020-01-01 RX ADMIN — CAPSAICIN: 0.25 CREAM TOPICAL at 20:02

## 2020-01-01 RX ADMIN — DILTIAZEM HYDROCHLORIDE 120 MG: 120 CAPSULE, EXTENDED RELEASE ORAL at 22:36

## 2020-01-01 RX ADMIN — INSULIN LISPRO 1 UNITS: 100 INJECTION, SOLUTION INTRAVENOUS; SUBCUTANEOUS at 14:20

## 2020-01-01 RX ADMIN — DRONEDARONE 400 MG: 400 TABLET, FILM COATED ORAL at 18:12

## 2020-01-01 RX ADMIN — INSULIN GLARGINE 48 UNITS: 100 INJECTION, SOLUTION SUBCUTANEOUS at 09:48

## 2020-01-01 RX ADMIN — ROPINIROLE HYDROCHLORIDE 0.5 MG: 0.5 TABLET, FILM COATED ORAL at 09:26

## 2020-01-01 RX ADMIN — DILTIAZEM HYDROCHLORIDE 120 MG: 120 CAPSULE, EXTENDED RELEASE ORAL at 09:48

## 2020-01-01 RX ADMIN — MAGNESIUM 64 MG (MAGNESIUM CHLORIDE) TABLET,DELAYED RELEASE 64 MG: at 10:38

## 2020-01-01 RX ADMIN — PRASUGREL 10 MG: 10 TABLET, FILM COATED ORAL at 23:02

## 2020-01-01 RX ADMIN — INSULIN LISPRO 8 UNITS: 100 INJECTION, SOLUTION INTRAVENOUS; SUBCUTANEOUS at 18:09

## 2020-01-01 RX ADMIN — DILTIAZEM HYDROCHLORIDE 120 MG: 120 CAPSULE, EXTENDED RELEASE ORAL at 09:34

## 2020-01-01 RX ADMIN — MANNITOL 50 G: 20 INJECTION, SOLUTION INTRAVENOUS at 18:12

## 2020-01-01 RX ADMIN — OXYBUTYNIN CHLORIDE 5 MG: 5 TABLET ORAL at 09:48

## 2020-01-01 RX ADMIN — RIVAROXABAN 15 MG: 15 TABLET, FILM COATED ORAL at 09:54

## 2020-01-01 RX ADMIN — INSULIN LISPRO 1 UNITS: 100 INJECTION, SOLUTION INTRAVENOUS; SUBCUTANEOUS at 18:35

## 2020-01-01 RX ADMIN — MONTELUKAST SODIUM 10 MG: 10 TABLET, FILM COATED ORAL at 22:37

## 2020-01-01 RX ADMIN — OXYBUTYNIN CHLORIDE 5 MG: 5 TABLET ORAL at 18:50

## 2020-01-01 RX ADMIN — FAMOTIDINE 10 MG: 20 TABLET ORAL at 20:09

## 2020-01-01 RX ADMIN — LINAGLIPTIN 5 MG: 5 TABLET, FILM COATED ORAL at 14:19

## 2020-01-01 RX ADMIN — MAGNESIUM 64 MG (MAGNESIUM CHLORIDE) TABLET,DELAYED RELEASE 64 MG: at 09:48

## 2020-01-01 RX ADMIN — LIDOCAINE HYDROCHLORIDE: 10 INJECTION, SOLUTION EPIDURAL; INFILTRATION; INTRACAUDAL; PERINEURAL at 10:44

## 2020-01-01 RX ADMIN — ROPINIROLE HYDROCHLORIDE 0.5 MG: 0.5 TABLET, FILM COATED ORAL at 23:40

## 2020-01-01 RX ADMIN — SODIUM CHLORIDE: 9 INJECTION, SOLUTION INTRAVENOUS at 01:17

## 2020-01-01 RX ADMIN — AMIODARONE HYDROCHLORIDE 1 MG/MIN: 1.8 INJECTION, SOLUTION INTRAVENOUS at 03:12

## 2020-01-01 RX ADMIN — Medication 15 ML: at 01:50

## 2020-01-01 RX ADMIN — LATANOPROST 1 DROP: 50 SOLUTION OPHTHALMIC at 09:48

## 2020-01-01 RX ADMIN — INSULIN LISPRO 8 UNITS: 100 INJECTION, SOLUTION INTRAVENOUS; SUBCUTANEOUS at 13:54

## 2020-01-01 RX ADMIN — ROPINIROLE HYDROCHLORIDE 0.5 MG: 0.5 TABLET, FILM COATED ORAL at 14:20

## 2020-01-01 RX ADMIN — RIVAROXABAN 15 MG: 15 TABLET, FILM COATED ORAL at 14:19

## 2020-01-01 RX ADMIN — DRONEDARONE 400 MG: 400 TABLET, FILM COATED ORAL at 10:39

## 2020-01-01 RX ADMIN — PRASUGREL 10 MG: 10 TABLET, FILM COATED ORAL at 09:48

## 2020-01-01 RX ADMIN — MONTELUKAST SODIUM 10 MG: 10 TABLET, FILM COATED ORAL at 23:40

## 2020-01-01 RX ADMIN — LEVOTHYROXINE SODIUM 88 MCG: 88 TABLET ORAL at 09:48

## 2020-01-01 RX ADMIN — Medication 15 ML: at 20:49

## 2020-01-01 RX ADMIN — ROPINIROLE HYDROCHLORIDE 0.5 MG: 0.5 TABLET, FILM COATED ORAL at 10:12

## 2020-01-01 RX ADMIN — METOPROLOL TARTRATE 25 MG: 25 TABLET ORAL at 01:06

## 2020-01-01 RX ADMIN — CAPSAICIN: 0.25 CREAM TOPICAL at 16:15

## 2020-01-01 RX ADMIN — Medication 2 PUFF: at 21:01

## 2020-01-01 RX ADMIN — ASPIRIN 81 MG 324 MG: 81 TABLET ORAL at 19:49

## 2020-01-01 RX ADMIN — FAMOTIDINE 20 MG: 10 INJECTION INTRAVENOUS at 09:10

## 2020-01-01 RX ADMIN — ROCURONIUM BROMIDE 150 MG: 10 INJECTION, SOLUTION INTRAVENOUS at 19:59

## 2020-01-01 RX ADMIN — MORPHINE SULFATE 4 MG: 4 INJECTION, SOLUTION INTRAMUSCULAR; INTRAVENOUS at 10:58

## 2020-01-01 RX ADMIN — INSULIN LISPRO 8 UNITS: 100 INJECTION, SOLUTION INTRAVENOUS; SUBCUTANEOUS at 18:36

## 2020-01-01 RX ADMIN — PRASUGREL 10 MG: 10 TABLET, FILM COATED ORAL at 10:39

## 2020-01-01 RX ADMIN — METOPROLOL TARTRATE 50 MG: 50 TABLET, FILM COATED ORAL at 23:40

## 2020-01-01 RX ADMIN — METHYLPREDNISOLONE ACETATE 80 MG: 80 INJECTION, SUSPENSION INTRA-ARTICULAR; INTRALESIONAL; INTRAMUSCULAR; SOFT TISSUE at 10:16

## 2020-01-01 RX ADMIN — INSULIN LISPRO 2 UNITS: 100 INJECTION, SOLUTION INTRAVENOUS; SUBCUTANEOUS at 16:10

## 2020-01-01 RX ADMIN — MANNITOL 100 G: 20 INJECTION, SOLUTION INTRAVENOUS at 21:20

## 2020-01-01 RX ADMIN — METOPROLOL TARTRATE 50 MG: 50 TABLET, FILM COATED ORAL at 09:48

## 2020-01-01 RX ADMIN — INSULIN LISPRO 2 UNITS: 100 INJECTION, SOLUTION INTRAVENOUS; SUBCUTANEOUS at 22:49

## 2020-01-01 RX ADMIN — INSULIN LISPRO 2 UNITS: 100 INJECTION, SOLUTION INTRAVENOUS; SUBCUTANEOUS at 03:54

## 2020-01-01 RX ADMIN — FAMOTIDINE 10 MG: 20 TABLET ORAL at 09:48

## 2020-01-01 RX ADMIN — PANTOPRAZOLE SODIUM 40 MG: 40 TABLET, DELAYED RELEASE ORAL at 05:54

## 2020-01-01 RX ADMIN — SODIUM CHLORIDE: 9 INJECTION, SOLUTION INTRAVENOUS at 20:54

## 2020-01-01 RX ADMIN — LEVOTHYROXINE SODIUM 88 MCG: 88 TABLET ORAL at 09:18

## 2020-01-01 RX ADMIN — DILTIAZEM HYDROCHLORIDE 120 MG: 120 CAPSULE, EXTENDED RELEASE ORAL at 01:06

## 2020-01-01 RX ADMIN — SODIUM CHLORIDE, PRESERVATIVE FREE 10 ML: 5 INJECTION INTRAVENOUS at 01:27

## 2020-01-01 RX ADMIN — INSULIN LISPRO 1 UNITS: 100 INJECTION, SOLUTION INTRAVENOUS; SUBCUTANEOUS at 10:19

## 2020-01-01 RX ADMIN — OXYBUTYNIN CHLORIDE 5 MG: 5 TABLET ORAL at 14:19

## 2020-01-01 RX ADMIN — DEXTROSE MONOHYDRATE 5 MG/HR: 50 INJECTION, SOLUTION INTRAVENOUS at 21:40

## 2020-01-01 RX ADMIN — IPRATROPIUM BROMIDE AND ALBUTEROL SULFATE 1 AMPULE: .5; 3 SOLUTION RESPIRATORY (INHALATION) at 21:06

## 2020-01-01 RX ADMIN — LEVOTHYROXINE SODIUM 100 MCG: 100 TABLET ORAL at 09:10

## 2020-01-01 RX ADMIN — ROPINIROLE HYDROCHLORIDE 0.5 MG: 0.5 TABLET, FILM COATED ORAL at 20:02

## 2020-01-01 RX ADMIN — SODIUM CHLORIDE, PRESERVATIVE FREE 10 ML: 5 INJECTION INTRAVENOUS at 20:03

## 2020-01-01 RX ADMIN — FAMOTIDINE 20 MG: 10 INJECTION INTRAVENOUS at 03:48

## 2020-01-01 RX ADMIN — FUROSEMIDE 20 MG: 40 INJECTION, SOLUTION INTRAMUSCULAR; INTRAVENOUS at 10:43

## 2020-01-01 RX ADMIN — DILTIAZEM HYDROCHLORIDE 120 MG: 120 CAPSULE, EXTENDED RELEASE ORAL at 23:01

## 2020-01-01 RX ADMIN — ROPINIROLE HYDROCHLORIDE 0.5 MG: 0.5 TABLET, FILM COATED ORAL at 22:35

## 2020-01-01 RX ADMIN — SODIUM CHLORIDE, PRESERVATIVE FREE 10 ML: 5 INJECTION INTRAVENOUS at 09:22

## 2020-01-01 RX ADMIN — LEVOTHYROXINE SODIUM 88 MCG: 88 TABLET ORAL at 09:34

## 2020-01-01 RX ADMIN — MAGNESIUM 64 MG (MAGNESIUM CHLORIDE) TABLET,DELAYED RELEASE 64 MG: at 18:09

## 2020-01-01 RX ADMIN — SODIUM CHLORIDE, PRESERVATIVE FREE 10 ML: 5 INJECTION INTRAVENOUS at 08:36

## 2020-01-01 RX ADMIN — DRONEDARONE 400 MG: 400 TABLET, FILM COATED ORAL at 18:04

## 2020-01-01 RX ADMIN — OXYBUTYNIN CHLORIDE 5 MG: 5 TABLET ORAL at 09:17

## 2020-01-01 RX ADMIN — SODIUM CHLORIDE, PRESERVATIVE FREE 10 ML: 5 INJECTION INTRAVENOUS at 20:49

## 2020-01-01 RX ADMIN — DRONEDARONE 400 MG: 400 TABLET, FILM COATED ORAL at 18:47

## 2020-01-01 RX ADMIN — METOPROLOL TARTRATE 25 MG: 25 TABLET ORAL at 09:36

## 2020-01-01 RX ADMIN — INSULIN GLARGINE 48 UNITS: 100 INJECTION, SOLUTION SUBCUTANEOUS at 09:36

## 2020-01-01 RX ADMIN — Medication 2 PUFF: at 05:45

## 2020-01-01 RX ADMIN — SODIUM CHLORIDE, PRESERVATIVE FREE 10 ML: 5 INJECTION INTRAVENOUS at 23:41

## 2020-01-01 RX ADMIN — DRONEDARONE 400 MG: 400 TABLET, FILM COATED ORAL at 09:48

## 2020-01-01 RX ADMIN — METOPROLOL TARTRATE 50 MG: 50 TABLET, FILM COATED ORAL at 09:17

## 2020-01-01 RX ADMIN — GUAIFENESIN 1200 MG: 600 TABLET, EXTENDED RELEASE ORAL at 23:40

## 2020-01-01 RX ADMIN — SODIUM CHLORIDE, PRESERVATIVE FREE 10 ML: 5 INJECTION INTRAVENOUS at 10:13

## 2020-01-01 RX ADMIN — PRASUGREL 10 MG: 10 TABLET, FILM COATED ORAL at 09:27

## 2020-01-01 RX ADMIN — DILTIAZEM HYDROCHLORIDE 120 MG: 120 CAPSULE, EXTENDED RELEASE ORAL at 10:39

## 2020-01-01 RX ADMIN — OXYBUTYNIN CHLORIDE 5 MG: 5 TABLET ORAL at 10:12

## 2020-01-01 RX ADMIN — INSULIN GLARGINE 48 UNITS: 100 INJECTION, SOLUTION SUBCUTANEOUS at 10:43

## 2020-01-01 RX ADMIN — LINAGLIPTIN 5 MG: 5 TABLET, FILM COATED ORAL at 09:17

## 2020-01-01 RX ADMIN — GUAIFENESIN 1200 MG: 600 TABLET, EXTENDED RELEASE ORAL at 09:17

## 2020-01-01 RX ADMIN — DRONEDARONE 400 MG: 400 TABLET, FILM COATED ORAL at 10:13

## 2020-01-01 RX ADMIN — ORPHENADRINE CITRATE 100 MG: 100 TABLET, EXTENDED RELEASE ORAL at 04:33

## 2020-01-01 RX ADMIN — DAKIN'S SOLUTION 0.125% (QUARTER STRENGTH): 0.12 SOLUTION at 03:44

## 2020-01-01 RX ADMIN — Medication 9.4 MILLICURIE: at 12:36

## 2020-01-01 RX ADMIN — MAGNESIUM 64 MG (MAGNESIUM CHLORIDE) TABLET,DELAYED RELEASE 64 MG: at 18:42

## 2020-01-01 RX ADMIN — MAGNESIUM 64 MG (MAGNESIUM CHLORIDE) TABLET,DELAYED RELEASE 64 MG: at 18:47

## 2020-01-01 RX ADMIN — LATANOPROST 1 DROP: 50 SOLUTION OPHTHALMIC at 10:38

## 2020-01-01 RX ADMIN — DILTIAZEM HYDROCHLORIDE 120 MG: 120 CAPSULE, EXTENDED RELEASE ORAL at 10:13

## 2020-01-01 RX ADMIN — DILTIAZEM HYDROCHLORIDE 120 MG: 120 CAPSULE, EXTENDED RELEASE ORAL at 20:02

## 2020-01-01 RX ADMIN — AMIODARONE HYDROCHLORIDE 150 MG: 1.5 INJECTION, SOLUTION INTRAVENOUS at 03:00

## 2020-01-01 RX ADMIN — ROPINIROLE HYDROCHLORIDE 0.5 MG: 0.5 TABLET, FILM COATED ORAL at 14:00

## 2020-01-01 RX ADMIN — MONTELUKAST SODIUM 10 MG: 10 TABLET, FILM COATED ORAL at 23:00

## 2020-01-01 RX ADMIN — Medication 2 PUFF: at 07:50

## 2020-01-01 RX ADMIN — METOPROLOL TARTRATE 25 MG: 25 TABLET ORAL at 23:01

## 2020-01-01 RX ADMIN — INSULIN LISPRO 2 UNITS: 100 INJECTION, SOLUTION INTRAVENOUS; SUBCUTANEOUS at 10:54

## 2020-01-01 RX ADMIN — TRAMADOL HYDROCHLORIDE 50 MG: 50 TABLET, FILM COATED ORAL at 23:00

## 2020-01-01 RX ADMIN — METOPROLOL TARTRATE 50 MG: 50 TABLET, FILM COATED ORAL at 20:02

## 2020-01-01 RX ADMIN — DILTIAZEM HYDROCHLORIDE 120 MG: 120 CAPSULE, EXTENDED RELEASE ORAL at 09:17

## 2020-01-01 RX ADMIN — IOPAMIDOL 80 ML: 755 INJECTION, SOLUTION INTRAVENOUS at 20:39

## 2020-01-01 RX ADMIN — AMIODARONE HYDROCHLORIDE 150 MG: 1.5 INJECTION, SOLUTION INTRAVENOUS at 05:11

## 2020-01-01 RX ADMIN — PANTOPRAZOLE SODIUM 40 MG: 40 TABLET, DELAYED RELEASE ORAL at 09:24

## 2020-01-01 RX ADMIN — METOPROLOL TARTRATE 50 MG: 50 TABLET, FILM COATED ORAL at 22:37

## 2020-01-01 RX ADMIN — DRONEDARONE 400 MG: 400 TABLET, FILM COATED ORAL at 09:18

## 2020-01-01 RX ADMIN — RIVAROXABAN 15 MG: 15 TABLET, FILM COATED ORAL at 09:17

## 2020-01-01 RX ADMIN — MAGNESIUM HYDROXIDE 30 ML: 400 SUSPENSION ORAL at 12:43

## 2020-01-01 RX ADMIN — TRAMADOL HYDROCHLORIDE 50 MG: 50 TABLET, FILM COATED ORAL at 10:11

## 2020-01-01 RX ADMIN — MANNITOL 50 G: 20 INJECTION, SOLUTION INTRAVENOUS at 04:20

## 2020-01-01 RX ADMIN — INSULIN LISPRO 8 UNITS: 100 INJECTION, SOLUTION INTRAVENOUS; SUBCUTANEOUS at 10:19

## 2020-01-01 ASSESSMENT — PAIN DESCRIPTION - PROGRESSION
CLINICAL_PROGRESSION: GRADUALLY WORSENING

## 2020-01-01 ASSESSMENT — PAIN DESCRIPTION - LOCATION
LOCATION: FOOT;KNEE
LOCATION: LEG
LOCATION: FOOT;KNEE
LOCATION: KNEE

## 2020-01-01 ASSESSMENT — ENCOUNTER SYMPTOMS
COLOR CHANGE: 0
BACK PAIN: 0
EYE PAIN: 0
VOMITING: 0
EYE ITCHING: 0
WHEEZING: 0
DIARRHEA: 0
SHORTNESS OF BREATH: 0
NAUSEA: 0
ABDOMINAL PAIN: 0
COUGH: 0
RHINORRHEA: 0
SORE THROAT: 0
EYE DISCHARGE: 0

## 2020-01-01 ASSESSMENT — PAIN SCALES - GENERAL
PAINLEVEL_OUTOF10: 5
PAINLEVEL_OUTOF10: 8
PAINLEVEL_OUTOF10: 8
PAINLEVEL_OUTOF10: 0
PAINLEVEL_OUTOF10: 8
PAINLEVEL_OUTOF10: 10
PAINLEVEL_OUTOF10: 5
PAINLEVEL_OUTOF10: 5
PAINLEVEL_OUTOF10: 7
PAINLEVEL_OUTOF10: 10
PAINLEVEL_OUTOF10: 0
PAINLEVEL_OUTOF10: 8
PAINLEVEL_OUTOF10: 0
PAINLEVEL_OUTOF10: 5
PAINLEVEL_OUTOF10: 0
PAINLEVEL_OUTOF10: 4

## 2020-01-01 ASSESSMENT — PULMONARY FUNCTION TESTS
PIF_VALUE: 26
PIF_VALUE: 22
PIF_VALUE: 26
PIF_VALUE: 27
PIF_VALUE: 25
PIF_VALUE: 21
PIF_VALUE: 26
PIF_VALUE: 26

## 2020-01-01 ASSESSMENT — PAIN DESCRIPTION - ORIENTATION
ORIENTATION: RIGHT

## 2020-01-01 ASSESSMENT — PAIN DESCRIPTION - DESCRIPTORS
DESCRIPTORS: ACHING
DESCRIPTORS: ACHING;DISCOMFORT

## 2020-01-01 ASSESSMENT — PAIN DESCRIPTION - FREQUENCY
FREQUENCY: INTERMITTENT
FREQUENCY: CONTINUOUS
FREQUENCY: CONTINUOUS

## 2020-01-01 ASSESSMENT — PAIN DESCRIPTION - ONSET
ONSET: GRADUAL
ONSET: GRADUAL
ONSET: ON-GOING
ONSET: ON-GOING
ONSET: GRADUAL

## 2020-01-01 ASSESSMENT — PAIN DESCRIPTION - PAIN TYPE
TYPE: ACUTE PAIN
TYPE: CHRONIC PAIN
TYPE: ACUTE PAIN
TYPE: ACUTE PAIN;CHRONIC PAIN
TYPE: ACUTE PAIN
TYPE: ACUTE PAIN;CHRONIC PAIN
TYPE: CHRONIC PAIN

## 2020-01-01 ASSESSMENT — PAIN - FUNCTIONAL ASSESSMENT
PAIN_FUNCTIONAL_ASSESSMENT: PREVENTS OR INTERFERES SOME ACTIVE ACTIVITIES AND ADLS
PAIN_FUNCTIONAL_ASSESSMENT: PREVENTS OR INTERFERES SOME ACTIVE ACTIVITIES AND ADLS
PAIN_FUNCTIONAL_ASSESSMENT: PREVENTS OR INTERFERES WITH MANY ACTIVE NOT PASSIVE ACTIVITIES
PAIN_FUNCTIONAL_ASSESSMENT: PREVENTS OR INTERFERES WITH MANY ACTIVE NOT PASSIVE ACTIVITIES

## 2020-01-13 NOTE — TELEPHONE ENCOUNTER
Alona Dhaliwal called requesting a refill on the following medications:  Requested Prescriptions     Pending Prescriptions Disp Refills    magnesium chloride (MAG DELAY) 64 MG TBEC extended release tablet 180 tablet 3     Sig: Take 1 tablet by mouth 2 times daily (with meals)    oxybutynin (DITROPAN) 5 MG tablet 90 tablet      Sig: Take 1 tablet by mouth daily as needed     Pharmacy verified:[facility]  . pv      Date of last visit: 12/11/19  Date of next visit (if applicable): 2/18/2190

## 2020-01-15 PROBLEM — R77.8 ELEVATED TROPONIN: Status: ACTIVE | Noted: 2020-01-01

## 2020-01-15 NOTE — ED PROVIDER NOTES
polyneuropathy associated with type 2 diabetes mellitus ([facility] [address] DM2 (diabetes mellitus, type 2) ([facility] [address] Fibromyalgia, History of DVT of lower extremity, History of pulmonary embolism, Hypertension, essential, Hypothyroidism, Left rotator cuff tear, Lymphedema, Microalbuminuria due to type 2 diabetes mellitus ([facility] [address] Morbid obesity ([facility] [address] STUART treated with BiPAP, and Tracheomalacia. SURGICAL HISTORY      has a past surgical history that includes Carpal tunnel release; Ankle Fusion (Right); knee surgery (Left); Foot surgery; Tonsillectomy; Leg Surgery; Coronary angioplasty with stent; Cataract removal; and Vena Cava Filter Placement.     CURRENT MEDICATIONS       Previous Medications    ACETAMINOPHEN (TYLENOL) 500 MG TABLET    Take 1 tablet by mouth 2 times daily as needed    ALBUTEROL SULFATE HFA (VENTOLIN HFA) 108 (90 BASE) MCG/ACT INHALER    Inhale 2 puffs into the lungs every 4 hours as needed for Wheezing or Shortness of Breath    AMOXICILLIN (AMOXIL) 500 MG CAPSULE    Take 4 cap po x1, one hour prior to procedure and take 3 caps PO x 1 day after procedure    AZELASTINE  MCG/SPRAY SOLN    2 sprays by Nasal route daily    BECLOMETHASONE (QVAR) 80 MCG/ACT INHALER    Inhale 2 puffs into the lungs 2 times daily    BENZONATATE (TESSALON) 200 MG CAPSULE    Take 1 capsule by mouth 3 times daily as needed for Cough    BIMATOPROST (LUMIGAN) 0.01 % SOLN OPHTHALMIC DROPS        BIPAP MACHINE MISC    by Does not apply route    BUDESONIDE-FORMOTEROL (SYMBICORT) 160-4.5 MCG/ACT AERO    Inhale 2 puffs into the lungs 2 times daily    DILTIAZEM (CARDIZEM CD) 120 MG EXTENDED RELEASE CAPSULE    Take 1 capsule by mouth 2 times daily    DOCUSATE SODIUM (COLACE) 100 MG CAPSULE    Take 100 mg by mouth 2 times daily    DOXYCYCLINE (VIBRAMYCIN) 100 MG CAPSULE    Take 100 mg by mouth 2 times daily    DRONEDARONE HCL (MULTAQ) 400 MG TABS    Take 400 mg by mouth 2 times daily (with meals)    FEXOFENADINE HCL (ALLEGRA ALLERGY PO) Result Time QTc Calculation (Bazett) P Axis R Axis T Axis   01/15/20 18:22:03 01/15/20 19:04:50 440 56 34 46         Preliminary result                Narrative:    Normal sinus rhythm  Low voltage QRS, consider pulmonary disease, pericardial effusion, or normal variant  Borderline ECG  No previous ECGs available                Preliminary result                Narrative:    Normal sinus rhythm  Low voltage QRS, consider pulmonary disease, pericardial effusion, or normal variant  Borderline ECG  No previous ECGs available                      RADIOLOGY: non-plain film images(s) such as CT, Ultrasound and MRI are read by the radiologist.  X-rays and CT scans read per radiology     CT HEAD WO CONTRAST (Final result)   Result time 01/15/20 17:49:04   Final result by Yadi Hill MD (01/15/20 17:49:04)                Impression:    No acute intracranial findings. **This report has been created using voice recognition software. It may contain minor errors which are inherent in voice recognition technology. **    Final report electronically signed by Dr. Rhoda Wilson on 1/15/2020 5:49 PM            Narrative:    PROCEDURE: CT HEAD WO CONTRAST    CLINICAL INFORMATION: Fall. COMPARISON: No prior study. TECHNIQUE: Noncontrast 5 mm axial images were obtained through the brain. All CT scans at this facility use dose modulation, iterative reconstruction, and/or weight-based dosing when appropriate to reduce radiation dose to as low as reasonably achievable. FINDINGS:    No acute intracranial findings. There is mild generalized volume loss and small vessel ischemic changes. Ventricles are within normal limits for age. No acute hemorrhage or midline shift. Ventricles are within normal limits for age. Paranasal sinuses mild mucosal thickening. Mastoid air cells are patent. Skull: Unremarkable.   Soft tissues: Unremarkable.                    XR KNEE RIGHT (MIN 4 VIEWS) (Final result)   Result time 01/15/20 XR ANKLE RIGHT (MIN 3 VIEWS)   Final result by Daniela Stephens MD (01/15/20 17:38:59)                Impression:    Prior postsurgical changes. No definite acute osseous findings. Correlation advised. **This report has been created using voice recognition software. It may contain minor errors which are inherent in voice recognition technology. **    Final report electronically signed by Dr. Marciano Holden on 1/15/2020 5:38 PM            Narrative:    PROCEDURE: XR ANKLE RIGHT (2 VIEWS)    CLINICAL INFORMATION: pain. COMPARISON: No prior study. TECHNIQUE: 3 views of the right ankle. FINDINGS:  Extensive postsurgical changes following fusion of the distal tibia and midfoot/calcaneus. Vascular calcifications. Amputation of the distal fibula. Prominent soft tissues. No definite acute osseous findings. Correlation advised.                    XR CHEST STANDARD (2 VW) (Final result)   Result time 01/15/20 17:41:53   Final result by Daniela Stephens MD (01/15/20 17:41:53)                Impression:      Possible mild interstitial edema. Correlation with symptoms advised. **This report has been created using voice recognition software. It may contain minor errors which are inherent in voice recognition technology. **    Final report electronically signed by Dr. Marciano Holden on 1/15/2020 5:41 PM            Narrative:    PROCEDURE: XR CHEST (2 VW)    CLINICAL INFORMATION: Cough. COMPARISON: November 19, 2019 TECHNIQUE: PA and lateral views the chest.    FINDINGS:  Elevated right hemidiaphragm. Mildly prominent interstitium correlate for interstitial edema. Cardiomegaly. Costophrenic recesses are preserved.  Degenerative changes both shoulders and thoracic spine.                      LABS:   Labs Reviewed   CBC WITH AUTO DIFFERENTIAL - Abnormal; Notable for the following components:       Result Value    WBC 11.0 (*)     RBC 3.99 (*)     Hemoglobin 10.2 (*)     Hematocrit 33.7 (*)     MCH 25.6 (*)     MCHC 30.3

## 2020-01-15 NOTE — ED TRIAGE NOTES
Patient presents with chronic right leg pain. States today, her pain is increased. Patient states she has issues with her legs due to chronic use of steroids for asthma. Uses Bipap at home. Patient states she is on O2 at night and when she sleeps.  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

## 2020-01-15 NOTE — TELEPHONE ENCOUNTER
Pt notified, states she  Is going to  Go to E.R. to be evaluated. Pt states she cancelled appt when she thought she was going to feel better. States she will reschedule depending on E.R. outcome .

## 2020-01-16 NOTE — CARE COORDINATION
DISCHARGE PLANNING EVALUATION: OP/OBSERVATION        1/16/20, 6:56 AM    Jhonny Anglin       Admitted from: ER 1/15/2020/ 1607   Location: [facility]19/019-A Reason for admit: Elevated troponin [R79.89]   Admit order signed?: yes    Procedure: Echo ordered. Pertinent Info/Orders/Treatment Plan:  Presented to ER after fall and coming down on rt leg. Pain in heel and knee. Hx extensive surgery on her rt leg. Also, COPD, A-fib on Effient. CKD, PE, DVT, DM. On Telemetry. Monitoring blood glucose. Consult to Cardiology. Troponin are elevated. Rt LE imaging neg for acute injury. PCP: Sarah Washington DO    Patient Goals/Plan/Treatment Preferences: Met with pt today. She is from home with spouse and they live in [place] [address] at [facility]. She does not have current [facility] services but this is available to her if needed at [facility]. She uses her powerchair for mobility, she has a V.E.S.T., a nebulizer and a bi-pap. She has a PCP, her spouse drives, basic needs are met and she denies issues getting medications. No needs voiced at this time. Transportation/Food Security/Housekeeping Addressed:  No issues identified.

## 2020-01-16 NOTE — ED NOTES
Attempted to call admitting floor but was put on hold before anyone spoke. Attempted to call a second time, with no answer.       Michael Reno RN  01/15/20 2027

## 2020-01-16 NOTE — ED NOTES
Upon first contact with patient this RN receives bedside shift report JO Dela Cruz. Patient resting in bed, talking with  at bedside. Denies any chest pain or SOB. VSS. IV inserted for admission and aspirin given per orders. Updated on POC and admission. Call light in reach. Will continue to monitor.       Kerri Reno RN  01/15/20 2000

## 2020-01-16 NOTE — PROGRESS NOTES
Hospitalist Progress Note      Patient:  Armand Pena    Unit/Bed:8A-19/019-A  YOB: 1945  MRN: [id number]   Acct: [de-identified]   PCP: Travis Tom DO  Date of Admission: 1/15/2020    Assessment and Plan:        1. Right lower leg/ankle pain: X-ray of the right knee 4 views as of 1/15/2020 did not show any fracture but showed moderate tricompartmental osteoarthritis, x-ray of the right ankle 2 views showed prior postsurgical changes, no fracture. 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  We will order duplex ultrasound bilateral lower extremity to rule out any DVT. Patient usually ambulate with power chair at home. Patient usually have deformed bilateral feet, also had severe osteomyelitis left knee and now she has no left knee as been removed because of previous osteomyelitis and septicemia. We will continue pain control with tramadol 50 mg every 12 hours as needed and we will consult orthopedic surgeons for further recommendations. Continue to trend troponin. 2. Mechanical fall: CT of the head did not show any intracranial hemorrhage, also x-rays did not show any fracture. Mechanical fall could be related to patient current medical condition and difficulty ambulation and chronically using power wheelchair. Continue fall precautions, consult PT/OT further recommendations. 3. Evaded troponin levels: On admission troponin negative then repeat 0.017, BNP normal.  EKG showed normal sinus rhythm with low voltage QRS. Cardiogram in the process. Cardiology team consulted and scheduled the patient for Luz Marina Appl stress test as of 1/16/2020. Awaiting for results. 4. Severe osteoarthritis with Charcot disease: Orthopedic surgeons consulted for further recommendations. 5. STUART on BiPAP: Continue BiPAP  6.  Chronic hypoxic respiratory failure on 4 L nasal cannula oxygen: Likely secondary to hematocrit 33.7 and platelets 962,244. BMP essentially unremarkable. proBNP 284.9. Troponin 0.022. Glucose 94. Influenza negative bilaterally. Chest x-ray possible mild interstitial edema x-ray right ankle postsurgical changes, nonacute. X-ray right foot no definite fracture, prior surgical changes with soft tissue swelling along the dorsum of the foot with bony demineralization. Right knee moderate tricompartmental osteoarthritis, no acute fracture. CT of the head nonacute. Patient admitted at this time for elevated troponin and interstitial edema. For further details and updates please refer to assessment and plan at the beginning of the note. ROS (10 point review of systems completed. Pertinent positives noted.  Otherwise ROS is negative) : Right lower leg pain  PMH:  Per HPI and reviewed in the chart  SHX: Reviewed in the chart, also the patient  FHX: Reviewed in the chart, also with the patient  Allergies: Reviewed in the chart  Medications:     dextrose        sodium chloride flush  10 mL Intravenous 2 times per day    ipratropium-albuterol  1 ampule Inhalation Q4H WA    insulin lispro  0-6 Units Subcutaneous TID WC    insulin lispro  0-3 Units Subcutaneous Nightly    Azelastine HCl  2 spray Nasal Daily    fluticasone  2 puff Inhalation BID    latanoprost  1 drop Both Eyes Daily    mometasone-formoterol  2 puff Inhalation BID    diltiazem  120 mg Oral BID    guaiFENesin  1,200 mg Oral BID    insulin glargine  48 Units Subcutaneous QAM AC    insulin lispro  8 Units Subcutaneous TID WC    ipratropium  2 spray Nasal TID    levothyroxine  88 mcg Oral Daily    magnesium chloride  1 tablet Oral BID WC    metoprolol tartrate  25 mg Oral BID    montelukast  10 mg Oral Nightly    oxybutynin  5 mg Oral Daily    pantoprazole  40 mg Oral QAM AC    prasugrel  10 mg Oral Daily    rivaroxaban  15 mg Oral Daily with breakfast    rOPINIRole  0.5 mg Oral TID    linagliptin  5 mg Oral Daily  sodium hypochlorite   Irrigation Daily    methylPREDNISolone  40 mg Intravenous Daily    furosemide  20 mg Intravenous Daily   Subjectives: Patient mentioned having right lower leg pain. Patient mentioned she had multiple DVTs in the past.  She was asking to get duplex ultrasound lower extremity. She has multiple questions been addressed. Nursing notes, labs, vitals reviewed. Vital Signs:   Vitals reviewed and compared with the previous ones  /66   Pulse 78   Temp 98.6 °F (37 °C) (Oral)   Resp 16   Ht 5' 8\" (1.727 m)   Wt 274 lb (124.3 kg)   SpO2 96%   BMI 41.66 kg/m²      Intake/Output Summary (Last 24 hours) at 1/16/2020 0723  Last data filed at 1/16/2020 0409  Gross per 24 hour   Intake --   Output 800 ml   Net -800 ml        General:   Age-appropriate, in no acute distress, morbid obese  HEENT:  normocephalic and atraumatic. No scleral icterus. PERRLA. Neck: supple. No thyromegaly. Lungs: Thick chest wall, clear to auscultation. No abnormal breathing sounds appreciated. Cardiac: S1, S2, RRR without murmur. No JVD. Abdomen: Increased abdominal girth, soft. Nontender. Bowel sounds positive. Extremities: Deformed bilateral feet, no left knee palpable, nonspecific tenderness right lower extremity started from the knee below, Alba sign negative right lower extremity. Deformed MTP joints laterally. Vasculature: capillary refill < 3 seconds. Difficulty detecting pulses bilateral lower extremity due to current deformities. Skin:  warm and dry. Not clammy. Psych:  Alert to time, person and place. Communicable. Affect appropriate  Lymph:  No supraclavicular ,and no anterior cervical lymphadenopathy. Neurologic:  No focal deficit. Decrease bilateral lower extremity sense. Motor and Sensory capacity intact in all extremities.     Labs:   Recent Labs     01/15/20  1800 01/16/20  0602   WBC 11.0* 9.1   HGB 10.2* 9.7*   HCT 33.7* 32.2*    314     Recent Labs 01/15/20  1800 01/16/20  0602    138   K 4.7 4.6    98   CO2 27 27   BUN 15 14   CREATININE 1.0 1.0   CALCIUM 9.8 9.6     No results for input(s): AST, ALT, BILIDIR, BILITOT, ALKPHOS in the last 72 hours. No results for input(s): INR in the last 72 hours. No results for input(s): Dorothea Medici in the last 72 hours. Microbiology:    Blood culture #1: No results found for: [facility]    Blood culture #2:No results found for: BLOODCULT2    Organism:  Lab Results   Component Value Date    ORG Proteus mirabilis 11/07/2019         Lab Results   Component Value Date    LABGRAM  11/07/2019     Few segmented neutrophils observed. Rare epithelial cells observed. Rare gram positive cocci occurring singly and in pairs. MRSA culture only:No results found for: [facility]    Urine culture: No results found for: LABURIN    Respiratory culture: No results found for: CULTRESP    Aerobic and Anaerobic :  Lab Results   Component Value Date    LABAERO  11/07/2019     Current antibiotic therapy ineffective in vitro for isolate. Culture also yielded light growth of Staphylococcus species (coagulase negative) and gram positive bacilli most consistent with Corynebacterium species. LABAERO light growth     11/07/2019     No results found for: LABANAE    Urinalysis:    No results found for: Bertrand Leghorn, BACTERIA, RBCUA, BLOODU, [address], [place] [address]    Radiology:  CT HEAD WO CONTRAST   Final Result   No acute intracranial findings. **This report has been created using voice recognition software. It may contain minor errors which are inherent in voice recognition technology. **      Final report electronically signed by Dr. Eber Queen on 1/15/2020 5:49 PM      XR KNEE RIGHT (MIN 4 VIEWS)   Final Result    IMPRESSION:   Moderate tricompartmental osteoarthritis. No acute fracture. **This report has been created using voice recognition software.  It may contain minor errors which are inherent in voice recognition technology. **      Final report electronically signed by Dr. Ge Barksdale on 1/15/2020 5:44 PM      XR FOOT RIGHT (MIN 3 VIEWS)   Final Result    IMPRESSION:   No definite acute fracture. Prior surgical changes. Soft tissue swelling along the dorsum of the foot with bony demineralization. **This report has been created using voice recognition software. It may contain minor errors which are inherent in voice recognition technology. **      Final report electronically signed by Dr. Ge Barksdale on 1/15/2020 5:45 PM      XR ANKLE RIGHT (2 VIEWS)   Final Result   Prior postsurgical changes. No definite acute osseous findings. Correlation advised. **This report has been created using voice recognition software. It may contain minor errors which are inherent in voice recognition technology. **      Final report electronically signed by Dr. Ge Barksdale on 1/15/2020 5:38 PM      XR CHEST STANDARD (2 VW)   Final Result      Possible mild interstitial edema. Correlation with symptoms advised. **This report has been created using voice recognition software. It may contain minor errors which are inherent in voice recognition technology. **      Final report electronically signed by Dr. Ge Barksdale on 1/15/2020 5:41 PM        Xr Chest Standard (2 Vw)    Result Date: 1/15/2020  PROCEDURE: XR CHEST (2 VW) CLINICAL INFORMATION: Cough. COMPARISON: November 19, 2019 TECHNIQUE: PA and lateral views the chest. FINDINGS: Elevated right hemidiaphragm. Mildly prominent interstitium correlate for interstitial edema. Cardiomegaly. Costophrenic recesses are preserved. Degenerative changes both shoulders and thoracic spine. Possible mild interstitial edema. Correlation with symptoms advised. **This report has been created using voice recognition software. It may contain minor errors which are inherent in voice recognition technology. ** Final report electronically signed by Dr. Ge Barksdale on 1/15/2020 5:41 PM    Xr Knee Right (min 4 Views)    Result Date: 1/15/2020  PROCEDURE: XR KNEE RIGHT (MIN 4 VIEWS) CLINICAL INFORMATION: pain. COMPARISON: No prior study. TECHNIQUE: 4 views of the right knee include an AP view, a lateral view and bilateral oblique views. FINDINGS: Moderate tricompartmental osteoarthritis. Vascular calcification. Narrowing of the medial and lateral compartment. Narrowing of the patellofemoral compartment. No acute fracture or dislocation. No significant suprapatellar joint effusion. IMPRESSION: Moderate tricompartmental osteoarthritis. No acute fracture. **This report has been created using voice recognition software. It may contain minor errors which are inherent in voice recognition technology. ** Final report electronically signed by Dr. Nolan Spaulding on 1/15/2020 5:44 PM    Xr Ankle Right (2 Views)    Result Date: 1/15/2020  PROCEDURE: XR ANKLE RIGHT (2 VIEWS) CLINICAL INFORMATION: pain. COMPARISON: No prior study. TECHNIQUE: 3 views of the right ankle. FINDINGS: Extensive postsurgical changes following fusion of the distal tibia and midfoot/calcaneus. Vascular calcifications. Amputation of the distal fibula. Prominent soft tissues. No definite acute osseous findings. Correlation advised. Prior postsurgical changes. No definite acute osseous findings. Correlation advised. **This report has been created using voice recognition software. It may contain minor errors which are inherent in voice recognition technology. ** Final report electronically signed by Dr. Nolan Spaulding on 1/15/2020 5:38 PM    Xr Foot Right (min 3 Views)    Result Date: 1/15/2020  PROCEDURE: XR FOOT RIGHT (MIN 3 VIEWS) CLINICAL INFORMATION: pain. COMPARISON: No prior study. TECHNIQUE: 4 views of the right foot. FINDINGS: Amputation of the distal first phalanx. Postsurgical changes following tibial calcaneal and midfoot fusion. Diffuse soft tissue swelling. Vascular calcifications. Osteopenia.  No definite acute fracture. IMPRESSION: No definite acute fracture. Prior surgical changes. Soft tissue swelling along the dorsum of the foot with bony demineralization. **This report has been created using voice recognition software. It may contain minor errors which are inherent in voice recognition technology. ** Final report electronically signed by Dr. Viki Lorenz on 1/15/2020 5:45 PM    Ct Head Wo Contrast    Result Date: 1/15/2020  PROCEDURE: CT HEAD WO CONTRAST CLINICAL INFORMATION: Fall. COMPARISON: No prior study. TECHNIQUE: Noncontrast 5 mm axial images were obtained through the brain. All CT scans at this facility use dose modulation, iterative reconstruction, and/or weight-based dosing when appropriate to reduce radiation dose to as low as reasonably achievable. FINDINGS: No acute intracranial findings. There is mild generalized volume loss and small vessel ischemic changes. Ventricles are within normal limits for age. No acute hemorrhage or midline shift. Ventricles are within normal limits for age. Paranasal sinuses mild mucosal thickening. Mastoid air cells are patent. Skull: Unremarkable. Soft tissues: Unremarkable. No acute intracranial findings. **This report has been created using voice recognition software. It may contain minor errors which are inherent in voice recognition technology. ** Final report electronically signed by Dr. Viki Lorenz on 1/15/2020 5:49 PM      Discussed plan with patient . Patient verbalized understanding and agree. All questions addressed with concerns. Please excuse my TYPOS!     Electronically signed by Marimar Ledezma MD on 1/16/2020 at 7:23 AM

## 2020-01-16 NOTE — CONSULTS
nasal spray 2 sprays by Nasal route 3 times daily    Historical Provider, MD   montelukast (SINGULAIR) 10 MG tablet Take 10 mg by mouth nightly    Historical Provider, MD   levalbuterol (XOPENEX) 1.25 MG/3ML nebulizer solution Take 1 ampule by nebulization every 8 hours as needed for Wheezing    Historical Provider, MD   Fexofenadine HCl (ALLEGRA ALLERGY PO) Take by mouth    Historical Provider, MD       Current Facility-Administered Medications   Medication Dose Route Frequency Provider Last Rate Last Dose    sodium chloride flush 0.9 % injection 10 mL  10 mL Intravenous 2 times per day Rangel Neighbors, DO   10 mL at 01/16/20 9151    sodium chloride flush 0.9 % injection 10 mL  10 mL Intravenous PRN Rangel Neighbors, DO        magnesium hydroxide (MILK OF MAGNESIA) 400 MG/5ML suspension 30 mL  30 mL Oral Daily PRN Rangel Neighbors, DO        ondansetron [facility]) injection 4 mg  4 mg Intravenous Q6H PRN Rangel Neighbors, DO        acetaminophen (TYLENOL) tablet 650 mg  650 mg Oral Q4H PRN Rangel Neighbors, DO        ipratropium-albuterol (DUONEB) nebulizer solution 1 ampule  1 ampule Inhalation Q4H [facility]        insulin lispro (HUMALOG) injection vial 0-6 Units  0-6 Units Subcutaneous TID WC Rangel Neighbors, DO        insulin lispro (HUMALOG) injection vial 0-3 Units  0-3 Units Subcutaneous Nightly Rangel Neighbors, DO   1 Units at 01/16/20 0056    glucose (GLUTOSE) 40 % oral gel 15 g  15 g Oral PRN Rangel Neighbors, DO        dextrose 50 % IV solution  12.5 g Intravenous PRN Rangel Neighbors, DO        glucagon (rDNA) injection 1 mg  1 mg Intramuscular PRN Rangel Neighbors, DO        dextrose 5 % solution  100 mL/hr Intravenous PRN Rangel Neighbors, DO        Azelastine HCl SOLN 2 spray  2 spray Nasal Daily Rangel Neighbors, DO        fluticasone (FLOVENT HFA) 110 MCG/ACT inhaler 2 puff  2 puff Inhalation BID Rangel Neighbors, DO        benzonatate (TESSALON) capsule 200 mg  200 mg Oral TID PRN Lillard Jobs, DO        latanoprost (XALATAN) 0.005 % ophthalmic solution 1 drop  1 drop Both Eyes Daily Lillard Jobs, DO        mometasone-formoterol (DULERA) 100-5 MCG/ACT inhaler 2 puff  2 puff Inhalation BID Lillard Jobs, DO        diltiazem (CARDIZEM CD) extended release capsule 120 mg  120 mg Oral BID Lillard Jobs, DO   120 mg at 01/16/20 7634    guaiFENesin (MUCINEX) extended release tablet 1,200 mg  1,200 mg Oral BID Lillard Jobs, DO        insulin glargine (LANTUS) injection vial 48 Units  48 Units Subcutaneous QAM  Lillard Jobs, DO        insulin lispro (HUMALOG) injection vial 8 Units  8 Units Subcutaneous TID  Lillard Jobs, DO        ipratropium (ATROVENT) 0.06 % nasal spray 2 spray  2 spray Nasal TID Maycolard Jobs, DO        albuterol (ACCUNEB) nebulizer solution 1.25 mg  1.25 mg Nebulization TID PRN Lillard Jobs, DO        levothyroxine (SYNTHROID) tablet 88 mcg  88 mcg Oral Daily Lillard Jobs, DO   88 mcg at 01/16/20 0934    magnesium chloride (MAG DELAY) extended release tablet 64 mg  1 tablet Oral BID  Lillard Jobs, DO   64 mg at 01/16/20 0935    metoprolol tartrate (LOPRESSOR) tablet 25 mg  25 mg Oral BID Lillard Jobs, DO   25 mg at 01/16/20 9627    montelukast (SINGULAIR) tablet 10 mg  10 mg Oral Nightly Lillard Jobs, DO        orphenadrine (NORFLEX) extended release tablet 100 mg  100 mg Oral BID PRN Lillard Jobs, DO        oxybutynin (DITROPAN) tablet 5 mg  5 mg Oral Daily Lillard Jobs, DO        pantoprazole (PROTONIX) tablet 40 mg  40 mg Oral QAM  Lillard Jobs, DO   40 mg at 01/16/20 0554    prasugrel (EFFIENT) tablet 10 mg  10 mg Oral Daily Lillard Jobs, DO        rivaroxaban (XARELTO) tablet 15 mg  15 mg Oral Daily with breakfast Maycolard Jobs, DO   15 mg at 01/16/20 0934    rOPINIRole (REQUIP) tablet 0.5 mg  0.5 mg Oral TID Mayco Guerra DO   0.5 mg at 01/16/20 0934    linagliptin (TRADJENTA) tablet 5 mg  5 mg peripheral pulses normal, no pedal edema, no clubbing or cyanosis  Skin - normal coloration and turgor, no rashes, no suspicious skin lesions noted      LABS:    Recent Labs     01/15/20  1800 01/15/20  2128 01/16/20  0602   TROPONINT 0.022* < 0.010 0.017*     CBC:   Lab Results   Component Value Date    WBC 9.1 01/16/2020    RBC 3.84 01/16/2020    HGB 9.7 01/16/2020    HCT 32.2 01/16/2020    MCV 83.9 01/16/2020    MCH 25.3 01/16/2020    MCHC 30.1 01/16/2020    RDW 16.0 03/07/2016     01/16/2020    MPV 10.0 01/16/2020     BMP:    Lab Results   Component Value Date     01/16/2020    K 4.6 01/16/2020    CL 98 01/16/2020    CO2 27 01/16/2020    BUN 14 01/16/2020    LABALBU 3.9 12/10/2019    CREATININE 1.0 01/16/2020    CALCIUM 9.6 01/16/2020    LABGLOM 54 01/16/2020    GLUCOSE 95 01/16/2020     Hepatic Function Panel:    Lab Results   Component Value Date    ALKPHOS 85 12/10/2019    ALT 13 12/10/2019    AST 21 12/10/2019    PROT 7.2 12/10/2019    BILITOT 0.4 12/10/2019    LABALBU 3.9 12/10/2019     Magnesium:    Lab Results   Component Value Date    MG 2.0 01/15/2020     Warfarin PT/INR:  No components found for: PTPATWAR, PTINRWAR  HgBA1c:    Lab Results   Component Value Date    LABA1C 8.0 10/07/2019     FLP:    Lab Results   Component Value Date    TRIG 111 12/10/2019    HDL 59 12/10/2019    LDLCALC 89 12/10/2019     TSH:    Lab Results   Component Value Date    TSH 44.630 12/10/2019     BNP: No components found for: PRO-BNP      Assessment/Plan:    Patient Active Problem List   Diagnosis    Allergic rhinitis    Ankylosis of left knee    ASHD (arteriosclerotic heart disease)    Asthma-COPD overlap syndrome (HCC)    Atrial fibrillation (HCC)    Bronchiectasis (HCC)    Charcot's joint    Chronic low back pain    Diabetic polyneuropathy associated with type 2 diabetes mellitus ([facility]    DM2 (diabetes mellitus, type 2) (HCC)    Fibromyalgia    History of DVT of lower extremity    History of pulmonary embolism    Hypertension, essential    Left rotator cuff tear    Lymphedema    Morbid obesity ([address]    STUART treated with BiPAP    Tracheomalacia    CKD (chronic kidney disease) stage 3, GFR 30-59 ml/min ([facility])    Wound of left leg    Wound of right leg    Lymphedema of both lower extremities    Microalbuminuria due to type 2 diabetes mellitus (HCC)    Hypothyroidism    Normocytic anemia    Elevated troponin     Elevated troponins  Mechanical fall  CAD s/p PCI   Afib on Xarelto  COPD on O2  STUART on BIPAP  DM  HTN  HLD    Continue to trend troponins  No cardiac symptoms at present  Get 2D Echo  Needs stress tests, Lexiscan  On Xarelto for Afib  On Effient for CAD, Discussed about switching to plavix to reduce bleeding risk  Continue metoprolol  Is allergic to statins  Continue rest of the management      Please do note hesitate to contact me for any further questions. Thank you for the opportunity to be involved in this patient's care.     Code Status: Full Code    Electronically signed by Wilman Meeks MD on 1/16/2020 at 9:48 AM

## 2020-01-16 NOTE — H&P
History & Physical        Patient:  Serena Galindo  YOB: 1945    MRN: [id number]     Acct: [de-identified]    PCP: Vickey Gonzalez DO    Date of Admission: 1/15/2020    Date of Service: Pt seen/examined on 1/15/2020   and Admitted to [place] [address] with expected LOS less than two midnights due to medical therapy. Chief Complaint:   Chief Complaint   Patient presents with    Leg Pain     right leg       History Of Present Illness:      76 y.o. female who presented to [address] [place] [address] with plaints of chronic right leg pain. Patient denies any recent injury or fall. Patient states that she has history of Charcot disease. She states that she has had increased right lower extremity pain and came into the ER for further evaluation of her symptoms. Patient denies any acute change in her breathing or complaints of shortness of breath. Patient states that she has chronic cough and utilizes steroids on a regular basis. Patient is O2 dependent at night 4 L nasal cannula. Patient endorses STUART and utilizes BiPAP at night. Patient denies nausea, vomiting, diarrhea constipation. Denies abdominal pain. Patient is without urinary complaints. Past medical history includes A. fib, diabetes, CKD, fibromyalgia, hypertension. Patient endorses history of stent placement to the RCA. Patient hemodynamically stable. Patient afebrile with temperature 99.5 °F.  Labs and imaging were obtained. CBC with mild leukocytosis with a WBC of 11,000, hemoglobin 10.2, hematocrit 33.7 and platelets 454,495. BMP essentially unremarkable. proBNP 284.9. Troponin 0.022. Glucose 94. Influenza negative bilaterally. Chest x-ray possible mild interstitial edema x-ray right ankle postsurgical changes, nonacute. X-ray right foot no definite fracture, prior surgical changes with soft tissue swelling along the dorsum of the foot with bony demineralization.   Right knee moderate tricompartmental osteoarthritis, no ophthalmic drops     Historical Provider, MD   guaiFENesin (MUCINEX) 600 MG extended release tablet Take 1,200 mg by mouth 2 times daily 3/26/13   Historical Provider, MD   losartan (COZAAR) 100 MG tablet Take 100 mg by mouth daily    Historical Provider, MD   prasugrel (EFFIENT) 10 MG TABS Take 10 mg by mouth daily    Historical Provider, MD   dronedarone hcl (MULTAQ) 400 MG TABS Take 400 mg by mouth 2 times daily (with meals)    Historical Provider, MD   doxycycline (VIBRAMYCIN) 100 MG capsule Take 100 mg by mouth 2 times daily    Historical Provider, MD   Insulin Glargine (TOUJEO SOLOSTAR SC) Inject 60 Units into the skin every morning (before breakfast)    Historical Provider, MD   rOPINIRole (REQUIP) 0.5 MG tablet Take 0.5 mg by mouth 3 times daily    Historical Provider, MD   docusate sodium (COLACE) 100 MG capsule Take 100 mg by mouth 2 times daily    Historical Provider, MD   pantoprazole sodium (PROTONIX) 40 MG PACK packet Take 40 mg by mouth daily Indications: 2 daily    Historical Provider, MD   BiPAP Machine MISC by Does not apply route    Historical Provider, MD   ipratropium (ATROVENT) 0.06 % nasal spray 2 sprays by Nasal route 3 times daily    Historical Provider, MD   montelukast (SINGULAIR) 10 MG tablet Take 10 mg by mouth nightly    Historical Provider, MD   levalbuterol (XOPENEX) 1.25 MG/3ML nebulizer solution Take 1 ampule by nebulization every 8 hours as needed for Wheezing    Historical Provider, MD   Fexofenadine HCl (ALLEGRA ALLERGY PO) Take by mouth    Historical Provider, MD       Allergies:  Hydrocodone; Cefdinir; Oxycodone; Seasonal; Statins; Sulfa antibiotics; and Valium [diazepam]    Social History:      The patient currently lives at home    TOBACCO:   reports that she has never smoked. She has never used smokeless tobacco.  ETOH:   reports no history of alcohol use. Family History:      No family history on file.     Diet:  DIET CARB CONTROL;    REVIEW OF SYSTEMS:   Pertinent positives as noted in the HPI. All other systems reviewed and negative. PHYSICAL EXAM:    BP (!) 165/70   Pulse 95   Temp 99.8 °F (37.7 °C) (Oral)   Resp 18   Ht 5' 8\" (1.727 m)   Wt 274 lb 6.4 oz (124.5 kg)   SpO2 95%   BMI 41.72 kg/m²     General appearance:  No apparent distress, appears stated age and cooperative. HEENT:  Normal cephalic, atraumatic without obvious deformity. Pupils equal, round, and reactive to light. Extra ocular muscles intact. Conjunctivae/corneas clear. Neck: Supple, with full range of motion. No jugular venous distention. Trachea midline. Respiratory:  Normal respiratory effort. Clear to auscultation, bilaterally without Rales/Wheezes/Rhonchi. Cardiovascular:  Regular rate and rhythm with normal S1/S2 without murmurs, rubs or gallops. Abdomen: Soft, non-tender, obese, non-distended with normal bowel sounds. Musculoskeletal:  No clubbing or cyanosis. Pedal edema bilaterally. Bilateral lower feet deformity  Skin: Skin color, texture, turgor normal.  No rashes or lesions. Neurologic:  Neurovascularly intact without any focal sensory/motor deficits. Cranial nerves: II-XII intact, grossly non-focal.  Psychiatric:  Alert and oriented, thought content appropriate, normal insight  Capillary Refill: Brisk,< 3 seconds   Peripheral Pulses: +2 palpable, equal bilaterally       Labs:     Recent Labs     01/15/20  1800   WBC 11.0*   HGB 10.2*   HCT 33.7*        Recent Labs     01/15/20  1800      K 4.7      CO2 27   BUN 15   CREATININE 1.0   CALCIUM 9.8     No results for input(s): AST, ALT, BILIDIR, BILITOT, ALKPHOS in the last 72 hours. No results for input(s): INR in the last 72 hours. No results for input(s): Mak Flatter in the last 72 hours. Urinalysis:    No results found for: Giselle Jen, BACTERIA, RBCUA, BLOODU, [address], [place] [address]    Radiology:       CT HEAD WO CONTRAST   Final Result   No acute intracranial findings.                **This report has been created using voice recognition software. It may contain minor errors which are inherent in voice recognition technology. **      Final report electronically signed by Dr. Alcira Perkins on 1/15/2020 5:49 PM      XR KNEE RIGHT (MIN 4 VIEWS)   Final Result    IMPRESSION:   Moderate tricompartmental osteoarthritis. No acute fracture. **This report has been created using voice recognition software. It may contain minor errors which are inherent in voice recognition technology. **      Final report electronically signed by Dr. Alcira Perkins on 1/15/2020 5:44 PM      XR FOOT RIGHT (MIN 3 VIEWS)   Final Result    IMPRESSION:   No definite acute fracture. Prior surgical changes. Soft tissue swelling along the dorsum of the foot with bony demineralization. **This report has been created using voice recognition software. It may contain minor errors which are inherent in voice recognition technology. **      Final report electronically signed by Dr. Alcira Perkins on 1/15/2020 5:45 PM      XR ANKLE RIGHT (2 VIEWS)   Final Result   Prior postsurgical changes. No definite acute osseous findings. Correlation advised. **This report has been created using voice recognition software. It may contain minor errors which are inherent in voice recognition technology. **      Final report electronically signed by Dr. Alcira Perkins on 1/15/2020 5:38 PM      XR CHEST STANDARD (2 VW)   Final Result      Possible mild interstitial edema. Correlation with symptoms advised. **This report has been created using voice recognition software. It may contain minor errors which are inherent in voice recognition technology. **      Final report electronically signed by Dr. Alcira Perkins on 1/15/2020 5:41 PM               DVT prophylaxis: [] Lovenox                                 [x] SCDs                                 [] SQ Heparin                                 [] Encourage ambulation           [] Already on Anticoagulation    Code Status: Full Code      PT/OT Eval Status: Encouraged, if warranted    Disposition:    [x] Home       [] TCU       [] Rehab       [] Psych       [] SNF       [] [place]       [] Other-    ASSESSMENT:    Active Hospital Problems    Diagnosis Date Noted    Elevated troponin [R79.89] 01/15/2020     Priority: High    Hypothyroidism [E03.9]     Lymphedema of both lower extremities [I89.0] 11/07/2019    Asthma-COPD overlap syndrome ([facility] [J44.9]     ASHD (arteriosclerotic heart disease) [I25.10]     Atrial fibrillation ([facility]) [I48.91]     Chronic low back pain [M54.5, G89.29]     CKD (chronic kidney disease) stage 3, GFR 30-59 ml/min ([facility]) [N18.3]     Diabetic polyneuropathy associated with type 2 diabetes mellitus ([facility] [E11.42]     DM2 (diabetes mellitus, type 2) ([facility]) [E11.9]     Fibromyalgia [M79.7]     History of pulmonary embolism [Z86.711]     Hypertension, essential [I10]     Morbid obesity ([facility] [E66.01]     STUART treated with BiPAP [G47.33]        PLAN:    1. Admit to Telemetry Unit  2. Diet ADA  3. Diurese as tolerated  4. Analgesics PRN  5. Antiemetics PRN  6. DVT prophylaxis  7. PT/OT encouraged, if warranted  8. IS  9. Troponin trend  10. EKG in AM  11. Strict I/Os  12. Daily weights  13. Cardiology consult, apprec chart recs  14. Duonebs  15. Steroid therapy  16. BS and SSI  17. Echo pending  18. Will need to re-evaluate home medications in morning  19. Continue to monitor closely         Thank you Angel Farfan DO for the opportunity to be involved in this patient's care.     Electronically signed by Kira Gregg DO on 1/15/2020 at 9:39 PM

## 2020-01-17 NOTE — CARE COORDINATION
1/17/20, 2:17 PM    DISCHARGE ONGOING EVALUATION:     Khanh Miajres day: 0  Location: 8A-19/019-A Reason for admit: Elevated troponin [R79.89]  Elevated troponin [R79.89]   Treatment Plan of Care: Cardiology has seen and echo and stress are complete. Will medically manage and follow prn. Orthopedics consulted. Joint fluid removed for culture today. PT/OT following now. SW consulted. Inpt therapy at discharge, planning [facility]. Barriers to Discharge: Medical readiness.

## 2020-01-17 NOTE — CONSULTS
Orthopedic Consult    Requesting Physician: Dr. Migel eMjia:  Right knee pain     HISTORY OF PRESENT ILLNESS:      The patient is a 76 y.o. female  who ortho was consulted for right knee pain. States her right knee was propped up on a bench when it fell full force and hit the ground. States she experiences pain with any form of movement. She notes to have chronic knee pain for the past several years. She has a history of Charcot Delfina-tooth disease. She denies having fever, chills nausea and vomiting. She denies numbness and tingling. She is currently admitted for elevated Troponin. X-ray of the right knee demonstrates severe OA.        Past Medical History:    Past Medical History:   Diagnosis Date    Allergic rhinitis     Ankylosis of left knee     ASHD (arteriosclerotic heart disease)     [facility]     Atrial fibrillation ([facility]     Bronchiectasis ([facility]     Charcot's joint     Bilat LE    Chronic low back pain     CKD (chronic kidney disease) stage 3, GFR 30-59 ml/min ([facility])     Diabetic polyneuropathy associated with type 2 diabetes mellitus ([address]     DM2 (diabetes mellitus, type 2) ([facility]     Fibromyalgia     History of DVT of lower extremity     History of pulmonary embolism     Hypertension, essential     Hypothyroidism     Left rotator cuff tear     Lymphedema     Microalbuminuria due to type 2 diabetes mellitus ([address]     Morbid obesity ([address]     STUART treated with BiPAP     Tracheomalacia        Past Surgical History:    Past Surgical History:   Procedure Laterality Date    ANKLE FUSION Right     CARPAL TUNNEL RELEASE      CATARACT REMOVAL      CORONARY ANGIOPLASTY WITH STENT PLACEMENT      FOOT SURGERY      KNEE SURGERY Left     LEG SURGERY      rods placed    TONSILLECTOMY      VENA CAVA FILTER PLACEMENT         Medications Prior to Admission:   Current Facility-Administered Medications   Medication Dose Route Frequency Provider Last Rate Philomenariqueta Hashimoto, DO        benzonatate (TESSALON) capsule 200 mg  200 mg Oral TID PRN Enriqueta Hashimoto, DO        latanoprost (XALATAN) 0.005 % ophthalmic solution 1 drop  1 drop Both Eyes Daily Philomena Hashimoto, DO        diltiazem (CARDIZEM CD) extended release capsule 120 mg  120 mg Oral BID Enriqueta Hashimoto, DO   120 mg at 01/16/20 2301    guaiFENesin (MUCINEX) extended release tablet 1,200 mg  1,200 mg Oral BID Philomena Hashimoto, DO        insulin glargine (LANTUS) injection vial 48 Units  48 Units Subcutaneous QAM  Enriqueta Hashimoto, DO        insulin lispro (HUMALOG) injection vial 8 Units  8 Units Subcutaneous TID  Enriqueta Hashimoto, DO   8 Units at 01/16/20 1836    albuterol (PROVENTIL) nebulizer solution 1.25 mg  1.25 mg Nebulization TID PRN Enriqueta Hashimoto, DO        levothyroxine (SYNTHROID) tablet 88 mcg  88 mcg Oral Daily Enriqueta Hashimoto, DO   88 mcg at 01/16/20 0934    magnesium chloride (MAG DELAY) extended release tablet 64 mg  1 tablet Oral BID  Philomenaqueta Hashimoto, DO   64 mg at 01/16/20 1842    metoprolol tartrate (LOPRESSOR) tablet 25 mg  25 mg Oral BID Enriqueta Hashimoto, DO   25 mg at 01/16/20 2301    montelukast (SINGULAIR) tablet 10 mg  10 mg Oral Nightly Philomena Hashimoto, DO   10 mg at 01/16/20 2300    orphenadrine (NORFLEX) extended release tablet 100 mg  100 mg Oral BID PRN Philomena Hashimoto, DO        oxybutynin (DITROPAN) tablet 5 mg  5 mg Oral Daily Philomena Hashimoto, DO   5 mg at 01/16/20 1850    pantoprazole (PROTONIX) tablet 40 mg  40 mg Oral QAM  Buffy Gaspar, DO   40 mg at 01/16/20 0554    prasugrel (EFFIENT) tablet 10 mg  10 mg Oral Daily Philomena Hashimoto, DO   10 mg at 01/16/20 2302    rivaroxaban (XARELTO) tablet 15 mg  15 mg Oral Daily with breakfast Enriqueta Hashimoto, DO   15 mg at 01/16/20 6063    rOPINIRole (REQUIP) tablet 0.5 mg  0.5 mg Oral TID Enriqueta Hashimoto, DO   0.5 mg at 01/16/20 2301    linagliptin (TRADJENTA) tablet 5 mg  5 mg Oral Daily Enriqueta Hashimoto, DO has been created using voice recognition software. It may contain minor errors which are inherent in voice recognition technology. ** Final report electronically signed by Dr. Alcira Perkins on 1/15/2020 5:41 PM    Xr Knee Right (min 4 Views)    Result Date: 1/15/2020  PROCEDURE: XR KNEE RIGHT (MIN 4 VIEWS) CLINICAL INFORMATION: pain. COMPARISON: No prior study. TECHNIQUE: 4 views of the right knee include an AP view, a lateral view and bilateral oblique views. FINDINGS: Moderate tricompartmental osteoarthritis. Vascular calcification. Narrowing of the medial and lateral compartment. Narrowing of the patellofemoral compartment. No acute fracture or dislocation. No significant suprapatellar joint effusion. IMPRESSION: Moderate tricompartmental osteoarthritis. No acute fracture. **This report has been created using voice recognition software. It may contain minor errors which are inherent in voice recognition technology. ** Final report electronically signed by Dr. Alcira Perkins on 1/15/2020 5:44 PM    Xr Ankle Right (2 Views)    Result Date: 1/15/2020  PROCEDURE: XR ANKLE RIGHT (2 VIEWS) CLINICAL INFORMATION: pain. COMPARISON: No prior study. TECHNIQUE: 3 views of the right ankle. FINDINGS: Extensive postsurgical changes following fusion of the distal tibia and midfoot/calcaneus. Vascular calcifications. Amputation of the distal fibula. Prominent soft tissues. No definite acute osseous findings. Correlation advised. Prior postsurgical changes. No definite acute osseous findings. Correlation advised. **This report has been created using voice recognition software. It may contain minor errors which are inherent in voice recognition technology. ** Final report electronically signed by Dr. Alcira Perkins on 1/15/2020 5:38 PM    Xr Foot Right (min 3 Views)    Result Date: 1/15/2020  PROCEDURE: XR FOOT RIGHT (MIN 3 VIEWS) CLINICAL INFORMATION: pain. COMPARISON: No prior study. TECHNIQUE: 4 views of the right foot.  FINDINGS: Amputation of the distal first phalanx. Postsurgical changes following tibial calcaneal and midfoot fusion. Diffuse soft tissue swelling. Vascular calcifications. Osteopenia. No definite acute fracture. IMPRESSION: No definite acute fracture. Prior surgical changes. Soft tissue swelling along the dorsum of the foot with bony demineralization. **This report has been created using voice recognition software. It may contain minor errors which are inherent in voice recognition technology. ** Final report electronically signed by Dr. Jonnathan Esparza on 1/15/2020 5:45 PM    Ct Head Wo Contrast    Result Date: 1/15/2020  PROCEDURE: CT HEAD WO CONTRAST CLINICAL INFORMATION: Fall. COMPARISON: No prior study. TECHNIQUE: Noncontrast 5 mm axial images were obtained through the brain. All CT scans at this facility use dose modulation, iterative reconstruction, and/or weight-based dosing when appropriate to reduce radiation dose to as low as reasonably achievable. FINDINGS: No acute intracranial findings. There is mild generalized volume loss and small vessel ischemic changes. Ventricles are within normal limits for age. No acute hemorrhage or midline shift. Ventricles are within normal limits for age. Paranasal sinuses mild mucosal thickening. Mastoid air cells are patent. Skull: Unremarkable. Soft tissues: Unremarkable. No acute intracranial findings. **This report has been created using voice recognition software. It may contain minor errors which are inherent in voice recognition technology. ** Final report electronically signed by Dr. Jonnathan Esparza on 1/15/2020 5:49 PM    Vl Dup Lower Extremity Venous Bilateral    Result Date: 1/16/2020  PROCEDURE: VL DUP LOWER EXTREMITY VENOUS BILATERAL CLINICAL INFORMATION: Right leg pain TECHNIQUE: High-resolution duplex ultrasound of the bilateral lower extremities was performed. The common femoral, femoral, popliteal and calf vein segments were studied to the ankles. COMPARISON: None. FINDINGS: There is normal compressibility with no evidence of thrombus. Flow study shows normal color flow, augmentation and phasic response. No evidence of deep venous thrombosis in either lower extremity. **This report has been created using voice recognition software. It may contain minor errors which are inherent in voice recognition technology. **  Final report electronically signed by Dr. Keith Buckley on 1/16/2020 3:40 PM      ASSESSMENT:Principal Problem:    Elevated troponin  Active Problems:    ASHD (arteriosclerotic heart disease)    Asthma-COPD overlap syndrome ([facility])    Atrial fibrillation ([facility])    Chronic low back pain    Diabetic polyneuropathy associated with type 2 diabetes mellitus [facility]    DM2 (diabetes mellitus, type 2) ([facility])    Fibromyalgia    History of pulmonary embolism    Hypertension, essential    Morbid obesity ([facility])    STUART treated with BiPAP    CKD (chronic kidney disease) stage 3, GFR 30-59 ml/min ([facility])    Lymphedema of both lower extremities    Hypothyroidism  Resolved Problems:    * No resolved hospital problems. *     Severe right knee DJD  Procedure  Right knee aspiration:  The right knee was identified, verbal consent received. Skin was cleansed with alcohol and anesthestized with Lidocaine 1% plain. Using sterile technique a 21 g needle inserted anterolateral. 5 cc of cloudy yellow fluid aspirated and sent for evaluation. Hemostasis obtained. Patient tolerated well. PLAN as discussed with Dr. Paris Jones:  -patient offered cortisone injection, she would like to defer as she has a history of diabetes. -WBAT RLE  -RICE  -Right lateral knee joint fluid sent for crystals, cells, and cultures. Further plan pending based upon joint fluid results.        Electronically signed by ERICA Deal CNP on 1/17/2020 at 9:14 AM

## 2020-01-17 NOTE — PROGRESS NOTES
[facility]  INPATIENT PHYSICAL THERAPY  EVALUATION  [facility] MED SURG 8A - 8A-19/[id number]-A    Time In: 020  Time Out: 1350  Timed Code Treatment Minutes: 10 Minutes  Minutes: 35          Date: 2020  Patient Name: Ludmila Diamond,  Gender:  female        MRN: [id number]  : 1945  (76 y.o.)      Referring Practitioner: Tres Foreman MD  Diagnosis: elevated troponin  Additional Pertinent Hx: Per EMR: \"67 y.o. pleasant female c hx of Afib on Xarelto, DM, CAD s/p PCI (5-10 yrs ago)Fibromyalgia, HTN, COPD on O2 at home, Trachemalasia, STUART on CPAP who presented to the hospital with complaints of right leg pain s/p fall. As per patient she has chronic pains in the right leg but 2 days ago she slipped and hurt her right foot. She had prior fusion in  of that leg. She has moved [facility] 3 months ago from [facility]. While in the ED patient was noted for abnormal troponins. Cardiology was consulted for this. EKG is SR with NSST. Echo from [address] [place] [address] in 2018 showed normal EF, with grade 1 diastolic dysfunction. \"     Restrictions/Precautions:  Restrictions/Precautions: Fall Risk  Position Activity Restriction  Other position/activity restrictions: left ankle partially fused, right ankle fully fused, left knee fused into extension    Subjective:  Chart Reviewed: Yes  Patient assessed for rehabilitation services?: Yes  Family / Caregiver Present: No  Subjective: RN approved PT evaluation. Pt in supine upon entry and was agreeable to PT interventions with max encouragement. Pt agreeable to stand pivot to bedside chair and then declined and was only agreeable to EOB activity. Pt requires redirection and cueing throughout session to remain on task. General:  Overall Orientation Status: Within Functional Limits  Follows Commands: Within Functional Limits    Vision: Impaired  Vision Exceptions: Wears glasses for distance    Hearing: Within functional limits         Pain:  Yes.   Pain Assessment  Pain Assessment: (value not rated)  Pain Type: Acute pain;Chronic pain  Pain Location: (right knee)  Pain Orientation: Right  Non-Pharmaceutical Pain Intervention(s): Repositioned  Pre Treatment Pain Screening  Intervention List: Nurse/physician notified    Social/Functional History:    Lives With: Spouse  Type of Home: House  Home Layout: One level  Home Access: Level entry  Home Equipment: Wheelchair-electric, Standard walker, 4 wheeled walker(trapeze, bed rail)                   Ambulation Assistance: Independent  Transfer Assistance: Independent          Additional Comments: Pt reports I prior to admission. Pt was completing stand pivots to power chair. Pt notes that over the past few weeks she has been using SW to assist her in stand pivoting to [address] OBJECTIVE:  Range of Motion:  Right Lower Extremity: WFL  Left Lower Extremity: Impaired - knee fusion into ext  Hx of bilat ankle fusion  Left ankle ROM: WFL    Strength:  Left Lower Extremity: Impaired - grossly deconditioned, grossly 2/5    Balance:  Static Standing Balance: Stand By Assistance, Contact Guard Assistance  Dynamic Standing Balance: Contact Guard Assistance    Bed Mobility:  Rolling to Right: Moderate Assistance   Supine to Sit: Moderate Assistance, X 2, with head of bed raised, with verbal cues , with increased time for completion    Transfers:  Not Tested  Pt refused further mobility. Exercise:  Patient was guided in 1 set(s) 5-10 reps of exercise to right lower extremities. Ankle pumps and Long arc quads. Exercises were completed for increased independence with functional mobility. Functional Outcome Measures: Completed  -PAC Inpatient Mobility without Stair Climbing Raw Score : 6  AM-PAC Inpatient without Stair Climbing T-Scale Score : 26.48    ASSESSMENT:  Activity Tolerance:  Patient tolerance of  treatment: poor. Pt declined further mobility secondary to right knee pain.          Treatment Initiated: Treatment and education initiated within context of evaluation. Evaluation time included review of current medical information, gathering information related to past medical, social and functional history, completion of standardized testing, formal and informal observation of tasks, assessment of data and development of plan of care and goals. Treatment time included skilled education and facilitation of tasks to increase safety and independence with functional mobility for improved independence and quality of life. Increased time spent discussing and educating pt regarding the importance of mobility during admission. PT set up room to pivot to bedside chair, pt then declined further mobility and was only agreeable to EOB activity. Pt requires max cueing with increased assist to get to EOB. Pt tolerated sitting EOB for 3-4 minutes. PT exited room with RN present and pt EOB. PT will cont to assess mobility if pt is agreeable. Assessment: Body structures, Functions, Activity limitations: Decreased functional mobility , Decreased posture, Decreased endurance, Decreased ROM, Decreased balance, Decreased strength, Decreased safe awareness, Increased pain  Assessment: Pt admitted secondary to elevated troponin. Prior to admission, pt with right heel and knee pain secondary to a fall. No fractures per imaging. Pt is grossly deconditioned and requires 2 person assist to sit EOB. Pt will benefit from skilled PT services during admission and post d/c for improved functional I and safety with mobility. Prognosis: Good    REQUIRES PT FOLLOW UP: Yes    Discharge Recommendations:  Discharge Recommendations: Continue to assess pending progress, Subacute/Skilled Nursing Facility    Patient Education:  PT Education: Plan of Care, Goals, PT Role    Equipment Recommendations:   Other: defer to next level of care    Plan:  Times per week: 3-5xGM  Current Treatment Recommendations: Strengthening, Patient/Caregiver Education & Training, ROM,

## 2020-01-17 NOTE — PROGRESS NOTES
Hospitalist Progress Note      Patient:  Chapito Evans    Unit/Bed:8A-19/019-A  YOB: 1945  MRN: [id number]   Acct: [de-identified]   PCP: Juan Bucio DO  Date of Admission: 1/15/2020    Assessment and Plan:        1. Right lower leg/ankle pain: X-ray of the right knee 4 views as of 1/15/2020 did not show any fracture but showed moderate tricompartmental osteoarthritis, x-ray of the right ankle 2 views showed prior postsurgical changes, no fracture. 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  Duplex ultrasound lower extremity did not reveal any DVT. Patient usually ambulate with power chair at home. Patient usually have deformed bilateral feet, also had severe osteomyelitis left knee and now she has no left knee as been removed because of previous osteomyelitis and septicemia. We will continue pain control with tramadol 50 mg every 12 hours as needed and we will consult orthopedic surgeons for further recommendations. 2. Mechanical fall: CT of the head did not show any intracranial hemorrhage, also x-rays did not show any fracture. Mechanical fall could be related to patient current medical condition and difficulty ambulation and chronically using power wheelchair. Continue fall precautions, consult PT/OT further recommendation possible short-term rehab.  to coordinate for pre-CERT. 3. Evaded troponin levels: On admission troponin negative then repeat 0.017, BNP normal.  EKG showed normal sinus rhythm with low voltage QRS. Echocardiogram 55% ejection fraction and minor aortic stenosis. Stress test negative as of 1/16/2020. Cardiology team consulted and they will see PRN as cardiac course stable. 4. Pulmonary edema chronic: Patient usually on Lasix daily. Repeat chest x-ray tomorrow if continue cough.   May increase Lasix to twice daily if more congestion detected on chest x-ray. 5. History of CAD status post PCI/stable  6. Severe osteoarthritis with Charcot disease: Orthopedic surgeons consulted for further recommendations. 7. STUART on BiPAP: Continue BiPAP  8. Chronic hypoxic respiratory failure on 4 L nasal cannula oxygen: Likely secondary to chronic noncontrolled asthma. Continue 4 L oxygen nasal cannula while in hospital and use BiPAP overnight. Currently on 4 L oxygen nasal cannula satting 94%. 9. Allergic rhinitis: Continue fluticasone nasal spray  10. Type 2 diabetes uncontrolled: Last A1c 8 as of October 2019, repeat A1c, adjust medications according to recent A1c results. Current blood sugar level 164. 11. Essential hypertension: Continue home antihypertensive medications, adjust medications if needed. 12. Restless leg syndrome: Continue ropinirole 0.5 mg every 8 hours. 13. Deconditioning: We will consult  for possible home health care/short-term rehab. 14. Hypothyroidism: Continue levothyroxine 88 mcg daily  15. Morbid obesity (BMI over 40): life style modifications as tolerated  16. Deconditioning: Due to above comorbidities, PT OT and  to coordinate for short-term rehab. PMH, PSH, SH, FHX reviewed in chart review snap shot in EPIC    CC: Right leg pain  HPI: Initial HPI reviewed as below:     76 y.o. female who presented to [facility] with plaints of chronic right leg pain. Patient denies any recent injury or fall. Patient states that she has history of Charcot disease. She states that she has had increased right lower extremity pain and came into the ER for further evaluation of her symptoms. Patient denies any acute change in her breathing or complaints of shortness of breath. Patient states that she has chronic cough and utilizes steroids on a regular basis. Patient is O2 dependent at night 4 L nasal cannula. Patient endorses STUART and utilizes BiPAP at night.   Patient denies nausea, vomiting, diarrhea 120 mg Oral BID    guaiFENesin  1,200 mg Oral BID    insulin glargine  48 Units Subcutaneous QAM AC    insulin lispro  8 Units Subcutaneous TID     levothyroxine  88 mcg Oral Daily    magnesium chloride  1 tablet Oral BID WC    montelukast  10 mg Oral Nightly    oxybutynin  5 mg Oral Daily    pantoprazole  40 mg Oral QAM AC    prasugrel  10 mg Oral Daily    rivaroxaban  15 mg Oral Daily with breakfast    rOPINIRole  0.5 mg Oral TID    linagliptin  5 mg Oral Daily    sodium hypochlorite   Irrigation Daily    furosemide  20 mg Intravenous Daily   Subjectives: Has less right lower leg pain today. Patient mentioned she had multiple DVTs in the past.  Plex ultrasound rule out DVT. Denies any shortness of breath or any other constitutional symptoms. She has multiple questions been addressed. Nursing notes, labs, vitals reviewed. Vital Signs:   Vitals reviewed and compared with the previous ones  /66   Pulse 89   Temp 99.3 °F (37.4 °C) (Oral)   Resp 16   Ht 5' 8\" (1.727 m)   Wt 274 lb (124.3 kg)   SpO2 92%   BMI 41.66 kg/m²      Intake/Output Summary (Last 24 hours) at 1/17/2020 1054  Last data filed at 1/16/2020 2125  Gross per 24 hour   Intake 340 ml   Output 600 ml   Net -260 ml        General:   Age-appropriate, in no acute distress, morbid obese  HEENT:  normocephalic and atraumatic. No scleral icterus. PERRLA. Neck: supple. No thyromegaly. Lungs: Thick chest wall, clear to auscultation. Minor crackles at the bases. Cardiac: S1, S2, RRR without murmur. No JVD. Abdomen: Increased abdominal girth, soft. Nontender. Bowel sounds positive. Extremities: Deformed bilateral feet, no left knee palpable, nonspecific tenderness right lower extremity started from the knee below, Alba sign negative right lower extremity. Deformed MTP joints laterally. Vasculature: capillary refill < 3 seconds.   Difficulty detecting pulses bilateral lower extremity due to current VENOUS BILATERAL   Final Result   No evidence of deep venous thrombosis in either lower extremity. **This report has been created using voice recognition software. It may contain minor errors which are inherent in voice recognition technology. **             Final report electronically signed by Dr. Neena Sinha on 1/16/2020 3:40 PM      CT HEAD WO CONTRAST   Final Result   No acute intracranial findings. **This report has been created using voice recognition software. It may contain minor errors which are inherent in voice recognition technology. **      Final report electronically signed by Dr. Marciano Holden on 1/15/2020 5:49 PM      XR KNEE RIGHT (MIN 4 VIEWS)   Final Result    IMPRESSION:   Moderate tricompartmental osteoarthritis. No acute fracture. **This report has been created using voice recognition software. It may contain minor errors which are inherent in voice recognition technology. **      Final report electronically signed by Dr. Marciano Holden on 1/15/2020 5:44 PM      XR FOOT RIGHT (MIN 3 VIEWS)   Final Result    IMPRESSION:   No definite acute fracture. Prior surgical changes. Soft tissue swelling along the dorsum of the foot with bony demineralization. **This report has been created using voice recognition software. It may contain minor errors which are inherent in voice recognition technology. **      Final report electronically signed by Dr. Marciano Holden on 1/15/2020 5:45 PM      XR ANKLE RIGHT (2 VIEWS)   Final Result   Prior postsurgical changes. No definite acute osseous findings. Correlation advised. **This report has been created using voice recognition software. It may contain minor errors which are inherent in voice recognition technology. **      Final report electronically signed by Dr. Marciano Holden on 1/15/2020 5:38 PM      XR CHEST STANDARD (2 VW)   Final Result      Possible mild interstitial edema.  Correlation with views of the right ankle. FINDINGS: Extensive postsurgical changes following fusion of the distal tibia and midfoot/calcaneus. Vascular calcifications. Amputation of the distal fibula. Prominent soft tissues. No definite acute osseous findings. Correlation advised. Prior postsurgical changes. No definite acute osseous findings. Correlation advised. **This report has been created using voice recognition software. It may contain minor errors which are inherent in voice recognition technology. ** Final report electronically signed by Dr. Salbador Joseph on 1/15/2020 5:38 PM    Xr Foot Right (min 3 Views)    Result Date: 1/15/2020  PROCEDURE: XR FOOT RIGHT (MIN 3 VIEWS) CLINICAL INFORMATION: pain. COMPARISON: No prior study. TECHNIQUE: 4 views of the right foot. FINDINGS: Amputation of the distal first phalanx. Postsurgical changes following tibial calcaneal and midfoot fusion. Diffuse soft tissue swelling. Vascular calcifications. Osteopenia. No definite acute fracture. IMPRESSION: No definite acute fracture. Prior surgical changes. Soft tissue swelling along the dorsum of the foot with bony demineralization. **This report has been created using voice recognition software. It may contain minor errors which are inherent in voice recognition technology. ** Final report electronically signed by Dr. Salbador Joseph on 1/15/2020 5:45 PM    Ct Head Wo Contrast    Result Date: 1/15/2020  PROCEDURE: CT HEAD WO CONTRAST CLINICAL INFORMATION: Fall. COMPARISON: No prior study. TECHNIQUE: Noncontrast 5 mm axial images were obtained through the brain. All CT scans at this facility use dose modulation, iterative reconstruction, and/or weight-based dosing when appropriate to reduce radiation dose to as low as reasonably achievable. FINDINGS: No acute intracranial findings. There is mild generalized volume loss and small vessel ischemic changes. Ventricles are within normal limits for age. No acute hemorrhage or midline shift. Ventricles are within normal limits for age. Paranasal sinuses mild mucosal thickening. Mastoid air cells are patent. Skull: Unremarkable. Soft tissues: Unremarkable. No acute intracranial findings. **This report has been created using voice recognition software. It may contain minor errors which are inherent in voice recognition technology. ** Final report electronically signed by Dr. Jonnathan Esparza on 1/15/2020 5:49 PM      Discussed plan with patient and the family. Patient and the family verbalized understanding and agree. All questions addressed with concerns. Please excuse my TYPOS!     Electronically signed by Herminia Holly MD on 1/17/2020 at 10:54 AM

## 2020-01-17 NOTE — PROGRESS NOTES
Nisha Fermin 60  OCCUPATIONAL THERAPY MISSED TREATMENT NOTE  [facility] MED SURG 8A  8A-19/019-A      Date: 2020  Patient Name: Teri Ford        CSN: [id number]   : 1945  (76 y.o.)  Gender: female   Referring Practitioner: Candance Royalty, MD  Diagnosis: Elevated Troponin         REASON FOR MISSED TREATMENT: Pt with multiple discipplines in and out of room. Unable to see pt this AM, will check back as time allows.

## 2020-01-17 NOTE — PROGRESS NOTES
Cardiology Progress Note      Patient:  Dimas Dao  YOB: 1945  MRN: [id number]   Acct: [de-identified]  Admit Date:  1/15/2020  Primary Cardiologist: none  Seen by Dr. Luz Arriaga    Per prior cardiology consult note-  Reason for Consultation:  Elevated troponins        History Of Present Illness:    76 y.o. pleasant female c hx of Afib on Xarelto, DM, CAD s/p PCI (5-10 yrs ago)Fibromyalgia, HTN, COPD on O2 at home, Trachemalasia, STUART on CPAP who presented to the hospital with complaints of right leg pain s/p fall. As per patient she has chronic pains in the right leg but 2 days ago she slipped and hurt her right foot. She had prior fusion in 2002 of that leg. She has moved [facility] 3 months ago from [facility]. While in the ED patient was noted for abnormal troponins. Cardiology was consulted for this. EKG is SR with NSST. Echo from [address] [place] [address] in 5/2018 showed normal EF, with grade 1 diastolic dysfunction    Subjective (Events in last 24 hours):      In bed no cardiac c/o  VSS    Discussed echo and stress results   Not here for cardiac c/o      Objective:   BP (!) 166/70   Pulse 86   Temp 99 °F (37.2 °C) (Oral)   Resp 18   Ht 5' 8\" (1.727 m)   Wt 274 lb (124.3 kg)   SpO2 93%   BMI 41.66 kg/m²        TELEMETRY: SR    Physical Exam:  General Appearance: alert and oriented to person, place and time, in no acute distress  Cardiovascular: normal rate, regular rhythm, normal S1 and S2, no murmurs, rubs, clicks, or gallops, distal pulses intact,   Pulmonary/Chest: clear to auscultation bilaterally- no wheezes, rales or rhonchi, normal air movement, no respiratory distress  Abdomen: soft, non-tender, non-distended, normal bowel sounds, no masses Extremities: no cyanosis, clubbing or edema, pulses present    Skin: warm and dry, no rash or erythema  Head: normocephalic and atraumatic  Musculoskeletal: normal range of motion, no joint swelling, deformity or tenderness  Neurological: alert, oriented, normal speech, no focal findings or movement disorder noted    Medications:    lidocaine  5 mL Intradermal Once    lidocaine PF        methylPREDNISolone acetate  80 mg Intramuscular Once    beclomethasone  2 puff Inhalation BID    ipratropium  2 spray Nasal TID    budesonide-formoterol  2 puff Inhalation BID    sodium chloride flush  10 mL Intravenous 2 times per day    ipratropium-albuterol  1 ampule Inhalation Q4H WA    insulin lispro  0-6 Units Subcutaneous TID WC    insulin lispro  0-3 Units Subcutaneous Nightly    latanoprost  1 drop Both Eyes Daily    diltiazem  120 mg Oral BID    guaiFENesin  1,200 mg Oral BID    insulin glargine  48 Units Subcutaneous QAM AC    insulin lispro  8 Units Subcutaneous TID WC    levothyroxine  88 mcg Oral Daily    magnesium chloride  1 tablet Oral BID WC    metoprolol tartrate  25 mg Oral BID    montelukast  10 mg Oral Nightly    oxybutynin  5 mg Oral Daily    pantoprazole  40 mg Oral QAM AC    prasugrel  10 mg Oral Daily    rivaroxaban  15 mg Oral Daily with breakfast    rOPINIRole  0.5 mg Oral TID    linagliptin  5 mg Oral Daily    sodium hypochlorite   Irrigation Daily    furosemide  20 mg Intravenous Daily      dextrose       regadenoson, 0.4 mg, ONCE PRN  traMADol, 50 mg, Q12H PRN  sodium chloride flush, 10 mL, PRN  magnesium hydroxide, 30 mL, Daily PRN  ondansetron, 4 mg, Q6H PRN  acetaminophen, 650 mg, Q4H PRN  glucose, 15 g, PRN  dextrose, 12.5 g, PRN  glucagon (rDNA), 1 mg, PRN  dextrose, 100 mL/hr, PRN  benzonatate, 200 mg, TID PRN  albuterol, 1.25 mg, TID PRN  orphenadrine, 100 mg, BID PRN        Diagnostics:  EKG:    15-ADAMS-2020 18:22:03 [facility] ROUTINE RETRIEVAL  Normal sinus rhythm  Low voltage QRS, consider pulmonary disease, pericardial effusion, or normal variant  Borderline ECG  No previous ECGs available  Confirmed by Christian Hamilton ([id number]) on 1/16/2020 7:18:08 AM    Echo:   Electronically signed by

## 2020-01-17 NOTE — PLAN OF CARE
Problem: Pain:  Goal: Pain level will decrease  Description  Pain level will decrease  Outcome: Met This Shift   Patient able to rate pain and recognizes the need to ask for PRN medication       Problem: Falls - Risk of:  Goal: Will remain free from falls  Description  Will remain free from falls  Outcome: Met This Shift   Free from falls this shift       Problem:   Goal: Adequate urinary output  Outcome: Met This Shift   No complications at this time   Problem:   Goal: Adequate urinary output  Outcome: Met This Shift  Strict I and O      Problem: Nutrition  Goal: Optimal nutrition therapy  Outcome: Met This Shift   Understands diet and carb control

## 2020-01-18 NOTE — PROGRESS NOTES
or pleural effusion. Only atelectasis. Incentive spirometry while awake, Mucinex twice daily. 6. Bilateral atelectasis: As mentioned in #5. 7. Pulmonary edema chronic: Patient usually on Lasix daily. Repeat chest x-ray tomorrow if continue cough. May increase Lasix to twice daily if more congestion detected on chest x-ray. 8. History of CAD status post PCI/stable  9. Severe osteoarthritis with Charcot disease: Orthopedic surgeons consulted for further recommendations. 10. STUART on BiPAP: Continue BiPAP  11. Chronic hypoxic respiratory failure on 4 L nasal cannula oxygen: Likely secondary to chronic noncontrolled asthma. Continue 4 L oxygen nasal cannula while in hospital and use BiPAP overnight. Currently on 4 L oxygen nasal cannula satting 94%. 12. Allergic rhinitis: Continue fluticasone nasal spray  13. Type 2 diabetes uncontrolled: Last A1c 8 as of October 2019, repeat A1c, adjust medications according to recent A1c results. Current blood sugar level 164. 14. Essential hypertension: Continue home antihypertensive medications, adjust medications if needed. 15. Restless leg syndrome: Continue ropinirole 0.5 mg every 8 hours. 16. Deconditioning: We will consult  for possible home health care/short-term rehab. 17. Hypothyroidism: Continue levothyroxine 88 mcg daily  18. Morbid obesity (BMI over 40): life style modifications as tolerated  19. Deconditioning: Due to above comorbidities, PT OT and  to coordinate for short-term rehab. PMH, PSH, SH, FHX reviewed in chart review snap shot in EPIC    CC: Right leg pain  HPI: Initial HPI reviewed as below:     76 y.o. female who presented to [facility] with plaints of chronic right leg pain. Patient denies any recent injury or fall. Patient states that she has history of Charcot disease. She states that she has had increased right lower extremity pain and came into the ER for further evaluation of her symptoms. Patient denies any acute change in her breathing or complaints of shortness of breath. Patient states that she has chronic cough and utilizes steroids on a regular basis. Patient is O2 dependent at night 4 L nasal cannula. Patient endorses STUART and utilizes BiPAP at night. Patient denies nausea, vomiting, diarrhea constipation. Denies abdominal pain. Patient is without urinary complaints. Past medical history includes A. fib, diabetes, CKD, fibromyalgia, hypertension. Patient endorses history of stent placement to the RCA.     Patient hemodynamically stable. Patient afebrile with temperature 99.5 °F.  Labs and imaging were obtained. CBC with mild leukocytosis with a WBC of 11,000, hemoglobin 10.2, hematocrit 33.7 and platelets 084,385. BMP essentially unremarkable. proBNP 284.9. Troponin 0.022. Glucose 94. Influenza negative bilaterally. Chest x-ray possible mild interstitial edema x-ray right ankle postsurgical changes, nonacute. X-ray right foot no definite fracture, prior surgical changes with soft tissue swelling along the dorsum of the foot with bony demineralization. Right knee moderate tricompartmental osteoarthritis, no acute fracture. CT of the head nonacute. Patient admitted at this time for elevated troponin and interstitial edema. For further details and updates please refer to assessment and plan at the beginning of the note. ROS (10 point review of systems completed. Pertinent positives noted.  Otherwise ROS is negative) : Right lower leg pain  PMH:  Per HPI and reviewed in the chart  SHX: Reviewed in the chart, also the patient  FHX: Reviewed in the chart, also with the patient  Allergies: Reviewed in the chart  Medications:     dextrose        mometasone-formoterol  2 puff Inhalation BID    fluticasone  2 puff Inhalation BID    lidocaine  5 mL Intradermal Once    dronedarone hcl  400 mg Oral BID     metoprolol tartrate  50 mg Oral BID    metoprolol tartrate  25 mg Oral effusion. IMPRESSION: Moderate tricompartmental osteoarthritis. No acute fracture. **This report has been created using voice recognition software. It may contain minor errors which are inherent in voice recognition technology. ** Final report electronically signed by Dr. Pooja Sutherland on 1/15/2020 5:44 PM    Xr Ankle Right (2 Views)    Result Date: 1/15/2020  PROCEDURE: XR ANKLE RIGHT (2 VIEWS) CLINICAL INFORMATION: pain. COMPARISON: No prior study. TECHNIQUE: 3 views of the right ankle. FINDINGS: Extensive postsurgical changes following fusion of the distal tibia and midfoot/calcaneus. Vascular calcifications. Amputation of the distal fibula. Prominent soft tissues. No definite acute osseous findings. Correlation advised. Prior postsurgical changes. No definite acute osseous findings. Correlation advised. **This report has been created using voice recognition software. It may contain minor errors which are inherent in voice recognition technology. ** Final report electronically signed by Dr. Pooja Sutherland on 1/15/2020 5:38 PM    Xr Foot Right (min 3 Views)    Result Date: 1/15/2020  PROCEDURE: XR FOOT RIGHT (MIN 3 VIEWS) CLINICAL INFORMATION: pain. COMPARISON: No prior study. TECHNIQUE: 4 views of the right foot. FINDINGS: Amputation of the distal first phalanx. Postsurgical changes following tibial calcaneal and midfoot fusion. Diffuse soft tissue swelling. Vascular calcifications. Osteopenia. No definite acute fracture. IMPRESSION: No definite acute fracture. Prior surgical changes. Soft tissue swelling along the dorsum of the foot with bony demineralization. **This report has been created using voice recognition software. It may contain minor errors which are inherent in voice recognition technology. ** Final report electronically signed by Dr. Pooja Sutherland on 1/15/2020 5:45 PM    Ct Head Wo Contrast    Result Date: 1/15/2020  PROCEDURE: CT HEAD WO CONTRAST CLINICAL INFORMATION: Fall.  COMPARISON: No prior

## 2020-01-18 NOTE — FLOWSHEET NOTE
Pt was anointed.       01/17/20 1955   Encounter Summary   Services provided to: Patient   Referral/Consult From: [facility]    Continue Visiting Yes  (1/17)   Complexity of Encounter Low   Length of Encounter 15 minutes   Routine   Type Initial   Assessment Approachable;Calm   Intervention [place];Prayer;Nurtured hope   Outcome Acceptance;Expressed gratitude;Encouraged   Sacraments   Sacrament of Sick-Anointing Anointed

## 2020-01-18 NOTE — PROGRESS NOTES
Orthopedic Progress Note    Juanita Lang  1/18/2020    Subjective:     Patient is a 75 yo female with right knee pain, which has significantly increased over the past several days. She is currently admitted for elevated troponin's; ortho was consulted and right knee was aspirated and joint fluid was sent for crystals, cells, and cultures. Still awaiting some culture results. Patient states she has had steroid injections in the past and tolerates them well, however she is a diabetic and has concerns about a steroid injection. Crystals showed monosodium urate crystals present, elevated total nucleated cells of 20,810, PMN elevated to 95. Gram stain showed Moderate segmented neutrophils observed  Culture preliminary results show no growth. Systemic or Specific Complaints: No Complaints and No unusual complaints    Objective:     BP (!) 140/52   Pulse 82   Temp 99.8 °F (37.7 °C)   Resp 16   Ht 5' 8\" (1.727 m)   Wt 274 lb (124.3 kg)   SpO2 90%   BMI 41.66 kg/m²     Intake/Output Summary (Last 24 hours) at 1/18/2020 1444  Last data filed at 1/18/2020 0333  Gross per 24 hour   Intake 400 ml   Output 900 ml   Net -500 ml     DRAIN/TUBE OUTPUT:       General: alert, appears stated age and cooperative       Extremity: Inspection shows mild swelling to lateral aspect of knee. Positive right knee lateral jointline tenderness. Denies calf pain to palpation. Limited range of motion. Pt cannot flex and extend toes due to total right ankle fusion. Sensation intact over right knee and calf. 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    DVT Exam: No evidence of DVT seen on physical exam.     Data Review  CBC:   Lab Results   Component Value Date    WBC 13.0 01/18/2020    RBC 4.02 01/18/2020    HGB 10.0 01/18/2020    HCT 33.1 01/18/2020     01/18/2020     BMP:    Lab Results   Component Value Date     01/18/2020    K 4.8 01/18/2020    CL 92 01/18/2020    CO2 27 01/18/2020    BUN 14 01/18/2020    CREATININE 1.1 01/18/2020    CALCIUM 9.3 01/18/2020    GLUCOSE 179 01/18/2020     PT/INR:    Lab Results   Component Value Date    PROTIME 16.6 03/07/2016    INR 1.39 03/07/2016       Radiology: See electronic record to view reports. Reports reviewed. Assessment:   Principal Problem:    Elevated troponin  Active Problems:    ASHD (arteriosclerotic heart disease)    Asthma-COPD overlap syndrome (HCC)    Atrial fibrillation (HCC)    Chronic low back pain    Diabetic polyneuropathy associated with type 2 diabetes mellitus (HCC)    DM2 (diabetes mellitus, type 2) ([facility])    Fibromyalgia    History of pulmonary embolism    Hypertension, essential    Morbid obesity ([facility])    STUART treated with BiPAP    CKD (chronic kidney disease) stage 3, GFR 30-59 ml/min ([facility])    Lymphedema of both lower extremities    Hypothyroidism  Resolved Problems:    * No resolved hospital problems. *     Severe right knee DJD    Plan:     -Awaiting culture results, since PMN elevated wait for depo-medrol injection.  -Continue medical management and pain control  -Elevate and ice affected knee PRN pain  -Will consider right knee depo-medrol injection tomorrow pending culture results.     Electronically signed by JAN Rebollar on 1/18/2020 at 2:44 PM

## 2020-01-19 NOTE — PLAN OF CARE
Problem: Pain:  Goal: Pain level will decrease  Description  Pain level will decrease  Outcome: Met This Shift   Able to state and rate pain   Problem: Risk for Impaired Skin Integrity  Goal: Tissue integrity - skin and mucous membranes  Description  Structural intactness and normal physiological function of skin and  mucous membranes.   Outcome: Met This Shift   No new skin issues at this time   Problem: Cardiovascular  Goal: No DVT, peripheral vascular complications  Outcome: Met This Shift   No s/s of DVT  Problem: GI  Goal: No bowel complications  Outcome: Met This Shift   No complications at this time   Problem:   Goal: Adequate urinary output  Outcome: Met This Shift   Strict I and o done at this time   Problem: Nutrition  Goal: Optimal nutrition therapy  Outcome: Met This Shift   Following diet order and strict I and o

## 2020-01-19 NOTE — PROGRESS NOTES
troponin  Active Problems:    ASHD (arteriosclerotic heart disease)    Asthma-COPD overlap syndrome (HCC)    Atrial fibrillation (HCC)    Chronic low back pain    Diabetic polyneuropathy associated with type 2 diabetes mellitus ([facility]    DM2 (diabetes mellitus, type 2) ([facility]    Fibromyalgia    History of pulmonary embolism    Hypertension, essential    Morbid obesity ([facility]    STUART treated with BiPAP    CKD (chronic kidney disease) stage 3, GFR 30-59 ml/min ([facility])    Lymphedema of both lower extremities    Hypothyroidism  Resolved Problems:    * No resolved hospital problems. *     Severe right knee DJD    Plan:   - Continue medical management and pain control  - Elevate and ice affected knee PRN pain  - Will consider right knee depo-medrol injection as an outpatient. We discussed in detail the risks/benefits in a depo-medrol injection and she would like to wait at this time. We discussed she can always call the office to pursue other treatment options for her right knee pain.   - Ortho signing off    Electronically signed by JAN Milton on 1/19/2020 at 7:31 AM

## 2020-01-19 NOTE — PROGRESS NOTES
Hospitalist Progress Note      Patient:  Simi Marion    Unit/Bed:8B-30/030-A  YOB: 1945  MRN: [id number]   Acct: [de-identified]   PCP: Radha Caraballo DO  Date of Admission: 1/15/2020    Assessment and Plan:        1. Right lower leg/ankle pain: X-ray of the right knee 4 views as of 1/15/2020 did not show any fracture but showed moderate tricompartmental osteoarthritis, x-ray of the right ankle 2 views showed prior postsurgical changes, no fracture. 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  Duplex ultrasound lower extremity did not reveal any DVT. Patient usually ambulate with power chair at home. Patient usually have deformed bilateral feet, also had severe osteomyelitis left knee and now she has no left knee as been removed because of previous osteomyelitis and septicemia. We will continue pain control with tramadol 50 mg every 12 hours as needed for no more than 5 days. 2. Severe right knee osteoarthritis: Appreciate orthopedic surgeons right knee aspiration and intra-articular corticosteroid injection. Patient opted to do this as an outpatient. 3. Slight increase in creatinine level without diagnosis of HIRAM: Creatinine level increased from 1.1-1.3 as of 1/19/2020. We will discontinue Protonix and start famotidine instead, discontinue Mucinex, encourage oral hydration, hold Lasix, repeat BMP in a.m.  4. Bilateral shoulders pain: This is a new complaint for the patient, the patient mentions she has been having bilateral shoulders pain for many years. The patient thinks about do some procedure for the shoulders. We instructed the patient to start capsaicin in addition to lidocaine both topically to be mixed together to use every 12 hours to help with the pain until she follow-up with her orthopedic surgeon as an outpatient when she get discharge. Patient agreeable.   5. Mechanical fall: CT of the head did not show any intracranial hemorrhage, also x-rays did not show any fracture. Mechanical fall could be related to patient current medical condition and difficulty ambulation and chronically using power wheelchair. Continue fall precautions, consult PT/OT further recommendation possible short-term rehab.  coordinated and patient accepted to [facility] as of 4/59/7889 with no pre-CERT. 6. Evaded troponin levels: On admission troponin negative then repeat 0.017, BNP normal.  EKG showed normal sinus rhythm with low voltage QRS. Echocardiogram 55% ejection fraction and minor aortic stenosis. Stress test negative as of 1/16/2020. Cardiology team consulted and they will see PRN as cardiac course stable. 7. Productive cough: Repeat chest x-ray as of 1/18/2020 did not show any infiltrates or pleural effusion. Only atelectasis. Incentive spirometry while awake, Mucinex twice daily. 8. Bilateral atelectasis: As mentioned in #5. 9. Pulmonary edema chronic: Patient usually on Lasix daily. Repeat chest x-ray tomorrow if continue cough. May increase Lasix to twice daily if more congestion detected on chest x-ray. 10. History of CAD status post PCI/stable  11. Severe osteoarthritis with Charcot disease: Orthopedic surgeons consulted for further recommendations. 12. STUART on BiPAP: Continue BiPAP  13. Chronic hypoxic respiratory failure on 4 L nasal cannula oxygen: Likely secondary to chronic noncontrolled asthma. Continue 4 L oxygen nasal cannula while in hospital and use BiPAP overnight. Currently on 4 L oxygen nasal cannula satting 94%. 14. Allergic rhinitis: Continue fluticasone nasal spray  15. Type 2 diabetes uncontrolled: Last A1c 8 as of October 2019, repeat A1c, adjust medications according to recent A1c results. Current blood sugar level 164. 12. Essential hypertension: Continue home antihypertensive medications, adjust medications if needed.   17. Restless surgical changes with soft tissue swelling along the dorsum of the foot with bony demineralization. Right knee moderate tricompartmental osteoarthritis, no acute fracture. CT of the head nonacute. Patient admitted at this time for elevated troponin and interstitial edema. For further details and updates please refer to assessment and plan at the beginning of the note. ROS (10 point review of systems completed. Pertinent positives noted. Otherwise ROS is negative) : Right lower leg pain, bilateral shoulders pain  PMH:  Per HPI and reviewed in the chart  SHX: Reviewed in the chart, also the patient  FHX: Reviewed in the chart, also with the patient  Allergies: Reviewed in the chart  Medications:     dextrose        capsaicin   Topical BID    mometasone-formoterol  2 puff Inhalation BID    fluticasone  2 puff Inhalation BID    lidocaine  5 mL Intradermal Once    dronedarone hcl  400 mg Oral BID WC    metoprolol tartrate  50 mg Oral BID    metoprolol tartrate  25 mg Oral Once    ipratropium  2 spray Nasal TID    sodium chloride flush  10 mL Intravenous 2 times per day    insulin lispro  0-6 Units Subcutaneous TID WC    insulin lispro  0-3 Units Subcutaneous Nightly    latanoprost  1 drop Both Eyes Daily    diltiazem  120 mg Oral BID    guaiFENesin  1,200 mg Oral BID    insulin glargine  48 Units Subcutaneous QAM AC    insulin lispro  8 Units Subcutaneous TID WC    levothyroxine  88 mcg Oral Daily    magnesium chloride  1 tablet Oral BID WC    montelukast  10 mg Oral Nightly    oxybutynin  5 mg Oral Daily    pantoprazole  40 mg Oral QAM AC    prasugrel  10 mg Oral Daily    rivaroxaban  15 mg Oral Daily with breakfast    rOPINIRole  0.5 mg Oral TID    linagliptin  5 mg Oral Daily    sodium hypochlorite   Irrigation Daily    furosemide  20 mg Intravenous Daily   Subjectives: Has less right lower leg pain today.   Patient mentioned she had multiple DVTs in the past.  Duplex ultrasound ruled out DVT. Cough improved today. New complaint of bilateral shoulders pain. Bilateral shoulders pain been going on for many years. Denies any shortness of breath or any other constitutional symptoms. She has multiple questions been addressed. Nursing notes, labs, vitals reviewed. Vital Signs:   Vitals reviewed and compared with the previous ones  BP (!) 119/45   Pulse 83   Temp 99.4 °F (37.4 °C) (Oral)   Resp 18   Ht 5' 8\" (1.727 m)   Wt 274 lb (124.3 kg)   SpO2 90%   BMI 41.66 kg/m²      Intake/Output Summary (Last 24 hours) at 1/19/2020 1525  Last data filed at 1/19/2020 1404  Gross per 24 hour   Intake 200 ml   Output 2075 ml   Net -1875 ml        General:   Age-appropriate, in no acute distress, morbid obese  HEENT:  normocephalic and atraumatic. No scleral icterus. PERRLA. Neck: supple. No thyromegaly. Lungs: Thick chest wall. Minor crackles at the bases. Cardiac: S1, S2, RRR without murmur. No JVD. Abdomen: Increased abdominal girth, soft. Nontender. Bowel sounds positive. Extremities: Deformed bilateral feet, no left knee palpable, nonspecific tenderness right lower extremity started from the knee below, Alba sign negative right lower extremity. Deformed MTP joints laterally. Difficulty raise arms above head. Decreased range of motion of rotator cuff muscles bilaterally. Vasculature: capillary refill < 3 seconds. Difficulty detecting pulses bilateral lower extremity due to current deformities. Skin:  warm and dry. Not clammy. Psych:  Alert to time, person and place. Communicable. Affect appropriate  Lymph:  No supraclavicular ,and no anterior cervical lymphadenopathy. Neurologic:  No focal deficit. Decrease bilateral lower extremity sense. Motor and Sensory capacity intact in all extremities.     Labs:   Recent Labs     01/17/20  0502 01/18/20  0426 01/19/20  0451   WBC 12.3* 13.0* 12.0*   HGB 10.6* 10.0* 9.7*   HCT 36.6* 33.1* 32.6*    333 348     Recent Labs     01/17/20  0502 01/18/20  0426 01/19/20  0451   * 131* 133*   K 4.6 4.8 4.9   CL 94* 92* 93*   CO2 25 27 25   BUN 12 14 22   CREATININE 0.9 1.1 1.3*   CALCIUM 9.4 9.3 9.1     No results for input(s): AST, ALT, BILIDIR, BILITOT, ALKPHOS in the last 72 hours. No results for input(s): INR in the last 72 hours. No results for input(s): Erven Sell in the last 72 hours. Microbiology:    Blood culture #1: No results found for: [facility]    Blood culture #2:No results found for: Rosaline Reyes    Organism:  Lab Results   Component Value Date    ORG Proteus mirabilis 11/07/2019         Lab Results   Component Value Date    LABGRAM  01/17/2020     Moderate segmented neutrophils observed. No epithelial cells observed. No bacteria seen. MRSA culture only:No results found for: [facility]    Urine culture: No results found for: LABURIN    Respiratory culture: No results found for: CULTRESP    Aerobic and Anaerobic :  Lab Results   Component Value Date    LABAERO  11/07/2019     Current antibiotic therapy ineffective in vitro for isolate. Culture also yielded light growth of Staphylococcus species (coagulase negative) and gram positive bacilli most consistent with Corynebacterium species. LABAERO light growth     11/07/2019     Lab Results   Component Value Date    LABANAE No growth-preliminary   01/17/2020       Urinalysis:    No results found for: Gwenette Leys, BACTERIA, RBCUA, BLOODU, SPECGRAV, GLUCOSEU    Radiology:  XR CHEST 1 VW   Final Result   1. Borderline heart size. 2. Minimal retrocardiac atelectasis. No effusion. No infiltrates. Final report electronically signed by Dr. Frances Galindo on 1/18/2020 8:02 AM      VL DUP LOWER EXTREMITY VENOUS BILATERAL   Final Result   No evidence of deep venous thrombosis in either lower extremity. **This report has been created using voice recognition software. It may contain minor errors which are inherent in voice recognition technology. ** Standard (2 Vw)    Result Date: 1/15/2020  PROCEDURE: XR CHEST (2 VW) CLINICAL INFORMATION: Cough. COMPARISON: November 19, 2019 TECHNIQUE: PA and lateral views the chest. FINDINGS: Elevated right hemidiaphragm. Mildly prominent interstitium correlate for interstitial edema. Cardiomegaly. Costophrenic recesses are preserved. Degenerative changes both shoulders and thoracic spine. Possible mild interstitial edema. Correlation with symptoms advised. **This report has been created using voice recognition software. It may contain minor errors which are inherent in voice recognition technology. ** Final report electronically signed by Dr. Nolan Spaulding on 1/15/2020 5:41 PM    Xr Knee Right (min 4 Views)    Result Date: 1/15/2020  PROCEDURE: XR KNEE RIGHT (MIN 4 VIEWS) CLINICAL INFORMATION: pain. COMPARISON: No prior study. TECHNIQUE: 4 views of the right knee include an AP view, a lateral view and bilateral oblique views. FINDINGS: Moderate tricompartmental osteoarthritis. Vascular calcification. Narrowing of the medial and lateral compartment. Narrowing of the patellofemoral compartment. No acute fracture or dislocation. No significant suprapatellar joint effusion. IMPRESSION: Moderate tricompartmental osteoarthritis. No acute fracture. **This report has been created using voice recognition software. It may contain minor errors which are inherent in voice recognition technology. ** Final report electronically signed by Dr. Nolan Spaulding on 1/15/2020 5:44 PM    Xr Ankle Right (2 Views)    Result Date: 1/15/2020  PROCEDURE: XR ANKLE RIGHT (2 VIEWS) CLINICAL INFORMATION: pain. COMPARISON: No prior study. TECHNIQUE: 3 views of the right ankle. FINDINGS: Extensive postsurgical changes following fusion of the distal tibia and midfoot/calcaneus. Vascular calcifications. Amputation of the distal fibula. Prominent soft tissues. No definite acute osseous findings. Correlation advised. Prior postsurgical changes.  No definite acute osseous findings. Correlation advised. **This report has been created using voice recognition software. It may contain minor errors which are inherent in voice recognition technology. ** Final report electronically signed by Dr. Salbador Joseph on 1/15/2020 5:38 PM    Xr Foot Right (min 3 Views)    Result Date: 1/15/2020  PROCEDURE: XR FOOT RIGHT (MIN 3 VIEWS) CLINICAL INFORMATION: pain. COMPARISON: No prior study. TECHNIQUE: 4 views of the right foot. FINDINGS: Amputation of the distal first phalanx. Postsurgical changes following tibial calcaneal and midfoot fusion. Diffuse soft tissue swelling. Vascular calcifications. Osteopenia. No definite acute fracture. IMPRESSION: No definite acute fracture. Prior surgical changes. Soft tissue swelling along the dorsum of the foot with bony demineralization. **This report has been created using voice recognition software. It may contain minor errors which are inherent in voice recognition technology. ** Final report electronically signed by Dr. Salbador Joseph on 1/15/2020 5:45 PM    Ct Head Wo Contrast    Result Date: 1/15/2020  PROCEDURE: CT HEAD WO CONTRAST CLINICAL INFORMATION: Fall. COMPARISON: No prior study. TECHNIQUE: Noncontrast 5 mm axial images were obtained through the brain. All CT scans at this facility use dose modulation, iterative reconstruction, and/or weight-based dosing when appropriate to reduce radiation dose to as low as reasonably achievable. FINDINGS: No acute intracranial findings. There is mild generalized volume loss and small vessel ischemic changes. Ventricles are within normal limits for age. No acute hemorrhage or midline shift. Ventricles are within normal limits for age. Paranasal sinuses mild mucosal thickening. Mastoid air cells are patent. Skull: Unremarkable. Soft tissues: Unremarkable. No acute intracranial findings. **This report has been created using voice recognition software.  It may contain minor errors which are inherent in voice recognition technology. ** Final report electronically signed by Dr. Hanny John on 1/15/2020 5:49 PM      Discussed plan with patient and the family. Patient and the family verbalized understanding and agree. All questions addressed with concerns. Please excuse my TYPOS!     Electronically signed by Ar Malone MD on 1/19/2020 at 3:25 PM

## 2020-01-20 NOTE — DISCHARGE INSTR - COC
Continuity of Care Form    Patient Name: Romana Crochet   :  1945  MRN:  [id number]    Admit date:  1/15/2020  Discharge date:  2020    Code Status Order: Full Code   Advance Directives:   [facility] Documentation     Date/Time Healthcare Directive Type of Healthcare Directive Copy in [address] Agent's Name Healthcare Agent's Phone Number    20 0010  No, patient does not have an advance directive for healthcare treatment -- -- -- -- --          Admitting Physician:  Josefa Acosta DO  PCP: Armen Denver, DO    Discharging Nurse: Bobby Peng Unit/Room#: 8B-30/030-A  Discharging Unit Phone Number: ***    Emergency Contact:   Extended Emergency Contact Information  Primary Emergency Contact: Heber Fairbanks  [place] Phone: [phone]  Mobile Phone: [phone]  Relation: Spouse  Secondary Emergency Contact: Abran Cárdenas [address] [place] [address] Phone: [phone]  Relation: Other    Past Surgical History:  Past Surgical History:   Procedure Laterality Date    ANKLE FUSION Right     CARPAL TUNNEL RELEASE      CATARACT REMOVAL      CORONARY ANGIOPLASTY WITH [facility] Left     LEG SURGERY      rods placed    TONSILLECTOMY     Jose Ramon         Immunization History:   Immunization History   Administered Date(s) Administered    Hepatitis A Adult (Havrix, Vaqta) 2018, 2018    Influenza Virus Vaccine 2018    Influenza, High Dose (Fluzone 65 yrs and older) 2015, 2016, 2017, 2018    Influenza, Triv, inactivated, subunit, adjuvanted, IM (Fluad 65 yrs and older) 10/07/2019       Active Problems:  Patient Active Problem List   Diagnosis Code    Allergic rhinitis J30.9    Ankylosis of left knee M24.662    ASHD (arteriosclerotic heart disease) I25.10    Asthma-COPD overlap syndrome (HCC) J44.9    Atrial fibrillation ([facility] I48.91    [facility]ct[facility] J47.9    Charcot's joint M14.60    Chronic low back pain M54.5, G89.29    Diabetic polyneuropathy associated with type 2 diabetes mellitus ([facility]) E11.42    DM2 (diabetes mellitus, type 2) ([facility]) E11.9    Fibromyalgia M79.7    History of DVT of lower extremity Z86.718    History of pulmonary embolism Z86.711    Hypertension, essential I10    Left rotator cuff tear M75.102    Lymphedema I89.0    Morbid obesity ([facility]) E66.01    STUART treated with BiPAP G47.33    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    CKD (chronic kidney disease) stage 3, GFR 30-59 ml/min ([facility]) N18.3    Wound of left leg S81.802A    Wound of right leg S81.801A    Lymphedema of both lower extremities I89.0    Microalbuminuria due to type 2 diabetes mellitus ([facility]) E11.29, R80.9    Hypothyroidism E03.9    Normocytic anemia D64.9    Elevated troponin R79.89       Isolation/Infection:   Isolation          No Isolation        Patient Infection Status     None to display          Nurse Assessment:  Last Vital Signs: BP (!) 141/64   Pulse 72   Temp 98.4 °F (36.9 °C) (Oral)   Resp 20   Ht 5' 8\" (1.727 m)   Wt 274 lb (124.3 kg)   SpO2 90%   BMI 41.66 kg/m²     Last documented pain score (0-10 scale): Pain Level: 0  Last Weight:   Wt Readings from Last 1 Encounters:   01/16/20 274 lb (124.3 kg)     Mental Status:  alert    IV Access:  - None    Nursing Mobility/ADLs:  Walking   Dependent  Transfer  Dependent  Bathing  Dependent  Dressing  Assisted  Toileting  Dependent  Feeding  Assisted  Med Admin  Dependent  Med Delivery   prefers mixed with applesauce     Wound Care Documentation and Therapy:  Wound 11/07/19 Leg Right (Active)   Number of days: 73        Elimination:  Continence:   · Bowel: No  · Bladder: Yes  Urinary Catheter: None   Colostomy/Ileostomy/Ileal Conduit: No       Date of Last BM: 01/17/2019      Intake/Output Summary (Last 24 hours) at 1/20/2020 0744  Last data filed at 1/20/2020 0319  Gross per 24 diagnosis listed and that she requires [place] for greater 30 days.      Update Admission H&P: No change in H&P    PHYSICIAN SIGNATURE:  Electronically signed by Fouzia Rodriguez MD on 1/20/20 at 10:27 AM

## 2020-01-20 NOTE — CARE COORDINATION
1/20/20, 3:02 PM    Patient goals/plan/ treatment preferences discussed by  and . Patient goals/plan/ treatment preferences reviewed with patient/ family. Patient/ family verbalize understanding of discharge plan and are in agreement with goal/plan/treatment preferences. Understanding was demonstrated using the teach back method. AVS provided by RN at time of discharge, which includes all necessary medical information pertaining to the patients current course of illness, treatment, post-discharge goals of care, and treatment preferences. Services After Discharge  Services At/After Discharge: In ambulance, Nursing Services, Aide Services, Skilled Therapy(Milo Morrissey)   IMM Letter  IMM Letter date given[de-identified] 01/20/20  IMM Letter time given[de-identified] 9628       Patient discharged to [facility], skilled medicare bed via [address] ambulance. [facility] notified of discharge and 3:00 pm transport time. Inessa OSORIO faxed AVS and MARS and called report.

## 2020-01-20 NOTE — PLAN OF CARE
Problem: Pain:  Goal: Pain level will decrease  Description  Pain level will decrease  1/19/2020 2104 by Eloy Vickers RN  Outcome: Ongoing  Note:   Patient complaining of pain all over. Cream applied per MAR. Problem: Pain:  Goal: Control of acute pain  Description  Control of acute pain  Outcome: Ongoing     Problem: Pain:  Goal: Control of chronic pain  Description  Control of chronic pain  Outcome: Ongoing     Problem: Risk for Impaired Skin Integrity  Goal: Tissue integrity - skin and mucous membranes  Description  Structural intactness and normal physiological function of skin and  mucous membranes. 1/19/2020 2104 by Eloy Vickers RN  Outcome: Ongoing  Note:   Patient is being turned and repositioned at least every two hours. Problem: Falls - Risk of:  Goal: Will remain free from falls  Description  Will remain free from falls  1/19/2020 2104 by Eloy Vickers RN  Outcome: Ongoing  Note:   Patient is free from falls. Patient is using call light appropriately at this time. Problem: Falls - Risk of:  Goal: Absence of physical injury  Description  Absence of physical injury  Outcome: Ongoing     Problem: Cardiovascular  Goal: No DVT, peripheral vascular complications  4/39/2591 2104 by Eloy Vickers RN  Outcome: Ongoing     Problem: Respiratory  Goal: No pulmonary complications  Outcome: Ongoing  Note:   LS clear at this time. Patient is wearing home BiPAP.       Problem: GI  Goal: No bowel complications  4/05/1580 2104 by Eloy Vickers RN  Outcome: Ongoing     Problem:   Goal: Adequate urinary output  1/19/2020 2104 by Eloy Vickers RN  Outcome: Ongoing     Problem: Nutrition  Goal: Optimal nutrition therapy  1/19/2020 2104 by Eloy Vickers RN  Outcome: Ongoing     Problem: Nutrition  Goal: Optimal nutrition therapy  1/19/2020 2104 by Eloy Vickers RN  Outcome: Ongoing     Problem: Discharge Planning:  Goal: Patients continuum of care needs are met  Description  Patients continuum of care needs are met  Outcome: Ongoing     Care plan reviewed with patient. Patient verbalize understanding of the plan of care and contribute to goal setting.

## 2020-01-20 NOTE — DISCHARGE SUMMARY
Hospital Medicine Discharge Summary      Patient Identification:   Rolando Raines   : 1945  MRN: [id number]   Account: [de-identified]      Patient's PCP: Olivia Cotton DO    Admit Date: 1/15/2020     Discharge Date: 2020     Admitting Physician: Petra Desai DO     Discharge Physician: Liset Rosales MD     Discharge Diagnoses: Active Hospital Problems    Diagnosis Date Noted    Elevated troponin [R79.89] 01/15/2020    Hypothyroidism [E03.9]     Lymphedema of both lower extremities [I89.0] 2019    Asthma-COPD overlap syndrome (HCC) [J44.9]     ASHD (arteriosclerotic heart disease) [I25.10]     Atrial fibrillation (HCC) [I48.91]     Chronic low back pain [M54.5, G89.29]     CKD (chronic kidney disease) stage 3, GFR 30-59 ml/min ([facility]) [N18.3]     Diabetic polyneuropathy associated with type 2 diabetes mellitus (HCC) [E11.42]     DM2 (diabetes mellitus, type 2) (HCC) [E11.9]     Fibromyalgia [M79.7]     History of pulmonary embolism [Z86.711]     Hypertension, essential [I10]     Morbid obesity ([facility] [E66.01]     STUART treated with BiPAP [G47.33]        The patient was seen and examined on day of discharge and this discharge summary is in conjunction with any daily progress note from day of discharge. Hospital Course:   Rolando Raines is a 76 y.o. female admitted to [address] [place] [address] on 1/15/2020 for:    76 y. o. female who presented to [address] [place] [address] with plaints of chronic right leg pain.  Patient denies any recent injury or fall.  Patient states that she has history of Charcot disease.  She states that she has had increased right lower extremity pain and came into the ER for further evaluation of her symptoms.  Patient denies any acute change in her breathing or complaints of shortness of breath.  Patient states that she has chronic cough and utilizes steroids on a regular basis.  Patient is O2 dependent at night 4 L nasal cannula.  Patient endorses STUART and utilizes BiPAP at night.  Patient denies nausea, vomiting, diarrhea constipation.  Denies abdominal pain.  Patient is without urinary complaints.  Past medical history includes A. fib, diabetes, CKD, fibromyalgia, hypertension.  Patient endorses history of stent placement to the RCA.     Patient hemodynamically stable.  Patient afebrile with temperature 99.5 °F.  Labs and imaging were obtained.  CBC with mild leukocytosis with a WBC of 11,000, hemoglobin 10.2, hematocrit 33.7 and platelets 188,282.  BMP essentially unremarkable.  proBNP 284. 9.  Troponin 0.022.  Glucose 80.  Influenza negative bilaterally.  Chest x-ray possible mild interstitial edema x-ray right ankle postsurgical changes, nonacute.  X-ray right foot no definite fracture, prior surgical changes with soft tissue swelling along the dorsum of the foot with bony demineralization.  Right knee moderate tricompartmental osteoarthritis, no acute fracture.  CT of the head nonacute.  Patient admitted at this time for elevated troponin and interstitial edema. Hospital problem list and updates as below:  1. Right lower leg/ankle pain: X-ray of the right knee 4 views as of 1/15/2020 did not show any fracture but showed moderate tricompartmental osteoarthritis, x-ray of the right ankle 2 views showed prior postsurgical changes, no fracture. 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  Duplex ultrasound lower extremity did not reveal any DVT. Patient usually ambulate with power chair at home. Patient usually have deformed bilateral feet, also had severe osteomyelitis left knee and now she has no left knee as been removed because of previous osteomyelitis and septicemia. We will continue pain control with tramadol 50 mg every 12 hours as needed for no more than 5 days.   2. Severe right knee osteoarthritis: Appreciate orthopedic surgeons right knee cough.  May increase Lasix to twice daily if more congestion detected on chest x-ray. 10. History of CAD status post PCI/stable  11. Severe osteoarthritis with Charcot disease: Orthopedic surgeons consulted for further recommendations. 12. STUART on BiPAP: Continue BiPAP  13. Chronic hypoxic respiratory failure on 4 L nasal cannula oxygen: Likely secondary to chronic noncontrolled asthma. Continue 4 L oxygen nasal cannula while in hospital and use BiPAP overnight. Currently on 4 L oxygen nasal cannula satting 94%. 14. Allergic rhinitis: Continue fluticasone nasal spray  15. Type 2 diabetes uncontrolled: Last A1c 8 as of October 2019, repeat A1c, adjust medications according to recent A1c results. Current blood sugar level 164. 12. Essential hypertension: Continue home antihypertensive medications, adjust medications if needed. 17. Restless leg syndrome: Continue ropinirole 0.5 mg every 8 hours. 18. Deconditioning: We will consult  for possible home health care/short-term rehab. 19. Hypothyroidism: Continue levothyroxine 88 mcg daily  20. Morbid obesity (BMI over 40): life style modifications as tolerated  21. Deconditioning: Due to above comorbidities, PT OT and  to coordinate for short-term rehab. Patient been accepted to [facility] on Monday, 1/20/2020 when medically ready. At the day of discharge we get the precertification approval to go to [facility] skilled facility.  arranged ambulance for transportation because of current medical condition and the patient cannot stand on her legs. We prescribed capsaicin in addition to lidocaine creams to apply on affected area every 12 hours as needed. Patient instructed to follow-up with her orthopedic surgeon as an outpatient. Patient was reluctant to get intra-articular corticosteroid injection because of the side effect and she will think about it.   All questions and concerns addressed. Patient verbalized understanding and was agreeable with discharge planning. Exam:     Vitals:  Vitals:    01/20/20 0316 01/20/20 0815 01/20/20 0831 01/20/20 1250   BP: (!) 141/64 112/60  137/61   Pulse: 72 76  74   Resp: 20 20 16   Temp: 98.4 °F (36.9 °C) 98.1 °F (36.7 °C)  98.3 °F (36.8 °C)   TempSrc: Oral Oral  Oral   SpO2: 90% 93% 92% (!) 87%   Weight:       Height:         Weight: Weight: 274 lb (124.3 kg)     24 hour intake/output:    Intake/Output Summary (Last 24 hours) at 1/20/2020 1702  Last data filed at 1/20/2020 0319  Gross per 24 hour   Intake 100 ml   Output 500 ml   Net -400 ml         General:   Age-appropriate, in no acute distress, morbid obese  HEENT:  normocephalic and atraumatic. No scleral icterus. PERRLA. Neck: supple. No thyromegaly. Lungs: Thick chest wall. Minor crackles at the bases. Cardiac: S1, S2, RRR without murmur. No JVD. Abdomen: Increased abdominal girth, soft. Nontender. Bowel sounds positive. Extremities: Deformed bilateral feet, no left knee palpable, nonspecific tenderness right lower extremity started from the knee below, Alba sign negative right lower extremity. Deformed MTP joints laterally. Difficulty raise arms above head. Decreased range of motion of rotator cuff muscles bilaterally. Vasculature: capillary refill < 3 seconds. Difficulty detecting pulses bilateral lower extremity due to current deformities. Skin:  warm and dry. Not clammy. Psych:  Alert to time, person and place. Communicable. Affect appropriate  Lymph:  No supraclavicular ,and no anterior cervical lymphadenopathy. Neurologic:  No focal deficit. Decrease bilateral lower extremity sense. Motor and Sensory capacity intact in all extremities. Labs:  For convenience and continuity at follow-up the following most recent labs are provided:      CBC:    Lab Results   Component Value Date    WBC 13.1 01/20/2020    HGB 9.6 01/20/2020    HCT 31.8 01/20/2020     01/20/2020       Renal:    Lab Results   Component Value Date     01/20/2020    K 5.0 01/20/2020    CL 94 01/20/2020    CO2 26 01/20/2020    BUN 24 01/20/2020    CREATININE 1.1 01/20/2020    CALCIUM 9.0 01/20/2020         Significant Diagnostic Studies    Radiology:   XR CHEST 1 VW   Final Result   1. Borderline heart size. 2. Minimal retrocardiac atelectasis. No effusion. No infiltrates. Final report electronically signed by Dr. Patricia Bravo on 1/18/2020 8:02 AM      VL DUP LOWER EXTREMITY VENOUS BILATERAL   Final Result   No evidence of deep venous thrombosis in either lower extremity. **This report has been created using voice recognition software. It may contain minor errors which are inherent in voice recognition technology. **             Final report electronically signed by Dr. Linda Islas on 1/16/2020 3:40 PM      CT HEAD WO CONTRAST   Final Result   No acute intracranial findings. **This report has been created using voice recognition software. It may contain minor errors which are inherent in voice recognition technology. **      Final report electronically signed by Dr. Nolan Spaulding on 1/15/2020 5:49 PM      XR KNEE RIGHT (MIN 4 VIEWS)   Final Result    IMPRESSION:   Moderate tricompartmental osteoarthritis. No acute fracture. **This report has been created using voice recognition software. It may contain minor errors which are inherent in voice recognition technology. **      Final report electronically signed by Dr. Nolan Spaulding on 1/15/2020 5:44 PM      XR FOOT RIGHT (MIN 3 VIEWS)   Final Result    IMPRESSION:   No definite acute fracture. Prior surgical changes. Soft tissue swelling along the dorsum of the foot with bony demineralization. **This report has been created using voice recognition software. It may contain minor errors which are inherent in voice recognition technology. **      Final report electronically signed by  SOLN  2 sprays by Nasal route daily             beclomethasone (QVAR) 80 MCG/ACT inhaler  Inhale 2 puffs into the lungs 2 times daily             bimatoprost (LUMIGAN) 0.01 % SOLN ophthalmic drops               BiPAP Machine MISC  by Does not apply route             budesonide-formoterol (SYMBICORT) 160-4.5 MCG/ACT AERO  Inhale 2 puffs into the lungs 2 times daily             capsaicin (ZOSTRIX) 0.025 % cream  Apply topically 2 times daily. diltiazem (CARDIZEM CD) 120 MG extended release capsule  Take 1 capsule by mouth 2 times daily             docusate sodium (COLACE) 100 MG capsule  Take 100 mg by mouth 2 times daily             dronedarone hcl (MULTAQ) 400 MG TABS  Take 400 mg by mouth 2 times daily (with meals)             Fexofenadine HCl (ALLEGRA ALLERGY PO)  Take by mouth             furosemide (LASIX) 20 MG tablet  Take 10 mg by mouth daily as needed              insulin aspart (NOVOLOG FLEXPEN) 100 UNIT/ML injection pen  Inject 8 Units into the skin 3 times daily (before meals)             Insulin Glargine (TOUJEO SOLOSTAR SC)  Inject 60 Units into the skin every morning (before breakfast)             Insulin Pen Needle (PEN NEEDLES) 31G X 8 MM MISC  Use to inject 4 times per day. Do not reuse needles. ipratropium (ATROVENT) 0.06 % nasal spray  2 sprays by Nasal route 3 times daily             levalbuterol (XOPENEX) 1.25 MG/3ML nebulizer solution  Take 1 ampule by nebulization every 8 hours as needed for Wheezing             levothyroxine (SYNTHROID) 88 MCG tablet  Take 1 tablet by mouth daily             lidocaine (LMX) 4 % cream  Apply topically as needed.              losartan (COZAAR) 100 MG tablet  Take 100 mg by mouth daily             magnesium chloride (MAG DELAY) 64 MG TBEC extended release tablet  Take 1 tablet by mouth 2 times daily (with meals)             metoprolol tartrate (LOPRESSOR) 25 MG tablet  Take 1 tablet by mouth 2 times daily             montelukast

## 2020-01-22 NOTE — TELEPHONE ENCOUNTER
Ernestina [facility] Transitions Initial Follow Up Call    Outreach made within 2 business days of discharge: Yes    Patient: Sonam Vargas Patient : 1945   MRN: [id number]  Reason for Admission: There are no discharge diagnoses documented for the most recent discharge. Discharge Date: 20       Spoke with: [facility] to return call at her earliest convenience     Discharge department/facility: [facility]  1.  Deconditioning: Due to above comorbidities, PT OT and  to coordinate for short-term rehab.  Patient been accepted to [facility] Monday, 2020 when medically ready.       TCM Interactive Patient Contact:      Scheduled appointment with PCP within 7-14 days    Follow Up  Future Appointments   Date Time Provider Christy Malik   2020  1:30 PM Vicki Cox 354   3/25/2020  1:00 PM Pro Bloom MD [address] [place] [address]

## 2020-02-14 PROBLEM — R77.8 ELEVATED TROPONIN: Status: RESOLVED | Noted: 2020-01-01 | Resolved: 2020-01-01

## 2020-02-17 NOTE — TELEPHONE ENCOUNTER
1st attempt to contact the pt re:overdue labs that Dr Anupama Hilton ordered on 12/11/19. No answer/no VM so no msg could be left.

## 2020-03-13 NOTE — TELEPHONE ENCOUNTER
Left message on machine for patient to please call us and reschedule bc Dr Ezio Smith was wanting to see her in the office soon. I explained this to the patient and left our number.

## 2020-03-16 NOTE — TELEPHONE ENCOUNTER
Diagnosis Orders   1.  Iron deficiency anemia, unspecified iron deficiency anemia type  [facility]) - Susie Wilde MD, Gastroenterology, [place] [address]     Future Appointments   Date Time Provider Christy Saucedoi   3/24/2020  8:45 AM Robles Mathias MD [address] [facility] [place]   3/25/2020  1:00 PM Vicki Crockett 354   3/31/2020  2:30 PM Vicki Crockett 354   4/23/2020  2:00 PM Olivia Cotton DO [address] [facility]

## 2020-03-19 NOTE — TELEPHONE ENCOUNTER
Patient is unsure what to do when the power goes out in their [place] because her and Hina Villalobos are on concentrators and do not have portable tanks. Please call her in regards to this, she has other questions as well. The appointment has been moved due to Pilo Morin.

## 2020-03-23 NOTE — TELEPHONE ENCOUNTER
Diagnosis Orders   1. Non-seasonal allergic rhinitis due to pollen  montelukast (SINGULAIR) 10 MG tablet   2. RLS (restless legs syndrome)  rOPINIRole (REQUIP) 1 MG tablet   3. Lymphedema  doxycycline hyclate (VIBRAMYCIN) 100 MG capsule   4. Wound of left lower extremity, sequela  doxycycline hyclate (VIBRAMYCIN) 100 MG capsule   5.  Wound of right lower extremity, sequela  doxycycline hyclate (VIBRAMYCIN) 100 MG capsule

## 2020-04-01 NOTE — TELEPHONE ENCOUNTER
Alona Dhaliwal called requesting a refill on the following medications:  Requested Prescriptions     Pending Prescriptions Disp Refills    ipratropium (ATROVENT) 0.06 % nasal spray 1 Bottle      Si sprays by Nasal route 3 times daily     Pharmacy verified: [facility] on [address]  . pv      Date of last visit: 2019  Date of next visit (if applicable):

## 2020-04-15 PROBLEM — I62.9 INTRACRANIAL HEMORRHAGE (HCC): Status: ACTIVE | Noted: 2020-01-01

## 2020-04-16 NOTE — ED NOTES
Temperature increased in room, bear hugger applied to pt, and warm blankets applied to pt      Sloan Najjar, RN  04/15/20 2021

## 2020-04-16 NOTE — H&P
be a futile reversal. No surgical intervention was advised. Family was approached regarding code status and request for [facility] at time of admission. Past Medical History:  See HPI. Family History: Mother and Father unknown. Social History:  Lifetime nonsmoker, Denies any ETOH or illicit drug use. Retired lives with ,     ROS limited  GENERAL: Positive for hypothermia, No chills, rigors. SKN: No lesions or rashes. Positive for ecchymotic left upper forehead left upper forearm and left lower thigh  HEAD: Positive for headache  EYES: No drainage  EARS: No drainage  NOSE/THROAT: No rhinorrhea or pharyngitis, no nasal drainage  NECK: No lumps or unusual neck stiffness  PULMONARY: No dyspnea, orthopnea or paroxysmal nocturnal dyspnea, stridor wheezing positive cough  CARDIAC: No chest pain, pressure, as it if for lower leg edema  GI: No history melena or hematochezia, no diarrhea or constipation  PERIPHERAL VASCULAR: No intermittent claudication or unusual leg cramps  MUSCULOSKELETAL: Occasional arthralgias, myalgias  NEUROLOGICAL: Positive for headache and syncope   HEMATOLOGIC:  No unusual bleeding  PSYCH: No history of depression or anxiety    Scheduled Meds:   rocuronium         Continuous Infusions:   propofol 25 mcg/kg/min (04/15/20 2201)    niCARdipine 15 mg/hr (04/15/20 2315)       PHYSICAL EXAMINATION:  T:  98.9.  P:  77. RR:  16. B/P:  143/69. FiO2:  80. O2 Sat:  98.  I/O:  pending  Body mass index is 41.66 kg/m². GCS:   3  General:   Morbidly obese female,   HEENT:  normocephalic and atraumatic. No scleral icterus. PERR nonreactive bilaterally  Neck: supple. No Thyromegaly. Lungs: clear to auscultation, in bases. No retractions. Orally intubated  Cardiac: S1-S2. No JVD. Abdomen: soft. Nontender, large bowel sounds, hypoactive. NG-LI WS  Extremities:  No clubbing, cyanosis, positive for bilateral lower leg edema. Positive for Charcot's foot bilaterally.   Positive for

## 2020-04-16 NOTE — CARE COORDINATION
4/16/20, 7:42 AM EDT  DISCHARGE PLANNING EVALUATION:    Lux [place]es       Admitted from: ED 4/15/2020/ [address] day: 1   Location: -03/003-A Reason for admit: Intracranial hemorrhage ([address] [I62.9] Status: IP  Admit order signed?: yes  PMH:  has a past medical history of Allergic rhinitis, Ankylosis of left knee, ASHD (arteriosclerotic heart disease), Asthma-COPD overlap syndrome ([address] Atrial fibrillation ([address] Bronchiectasis ([address] Charcot's joint, Chronic low back pain, CKD (chronic kidney disease) stage 3, GFR 30-59 ml/min ([address] Diabetic polyneuropathy associated with type 2 diabetes mellitus ([address] DM2 (diabetes mellitus, type 2) ([address] Fibromyalgia, History of DVT of lower extremity, History of pulmonary embolism, Hypertension, essential, Hypothyroidism, Left rotator cuff tear, Lymphedema, Microalbuminuria due to type 2 diabetes mellitus ([address] Morbid obesity ([address] STUART treated with BiPAP, and Tracheomalacia. 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

## 2020-04-16 NOTE — ED NOTES
Dr. Jane Jasso at [facility] ready to intubate along with respiratory. As etomidate was given by Will JO.  Pt started to vomit and was suctioned by respiratory      Go Arreguin RN  04/15/20 2000

## 2020-04-16 NOTE — FLOWSHEET NOTE
Pt was at the ED with her . She was unresponsive. Pt's  stated that: Pt had massive brain bleed and he brought her to the hospital. He said that things were not looking good at all. That's when he asked for prayers. I prayed asking God to heal the pt and to give the medical team the wisdom in knowing what to do. I offered words of encouragement and it was appreciated. 04/15/20 2220   Encounter Summary   Services provided to: Patient and family together   Referral/Consult From: Other    Support System Spouse   Place of [employer]   Continue Visiting Yes  (4/15 P F (NR))   Complexity of Encounter Low   Length of Encounter 15 minutes   Routine   Type Initial   Assessment Approachable; Anxious; Fearful   Intervention Active listening;Nurtured hope;Prayer;[place];Empowerment;Sustaining presence/ Ministry of presence   Outcome Acceptance;Expressed gratitude;Encouraged; Hopeful;Receptive

## 2020-04-16 NOTE — ED NOTES
Pt resting in bed with eyes closed.  Pt not responding to any stimuli at this time      Raymundo Rose RN  04/15/20 8758

## 2020-04-16 NOTE — ED NOTES
Pt not responding to painful stimuli.  Pt resting in bed tolerating vent well      Joselyn Zamora RN  04/15/20 7004

## 2020-04-16 NOTE — VIRTUAL HEALTH
reuse needles.  10/7/19   John Torres, DO   rivaroxaban (XARELTO) 15 MG TABS tablet Take 1 tablet by mouth daily (with breakfast) 10/7/19   John Torres, DO   Azelastine HCl 137 MCG/SPRAY SOLN 2 sprays by Nasal route daily 3/27/18   Historical Provider, MD   acetaminophen (TYLENOL) 500 MG tablet Take 1 tablet by mouth 2 times daily as needed    Historical Provider, MD   bimatoprost (LUMIGAN) 0.01 % SOLN ophthalmic drops     Historical Provider, MD   dronedarone hcl (MULTAQ) 400 MG TABS Take 400 mg by mouth 2 times daily (with meals)    Historical Provider, MD   Insulin Glargine (TOUJEO SOLOSTAR SC) Inject 60 Units into the skin every morning (before breakfast)    Historical Provider, MD   docusate sodium (COLACE) 100 MG capsule Take 100 mg by mouth 2 times daily    Historical Provider, MD   pantoprazole sodium (PROTONIX) 40 MG PACK packet Take 40 mg by mouth daily Indications: 2 daily    Historical Provider, MD   BiPAP Machine MISC by Does not apply route    Historical Provider, MD   Fexofenadine HCl (ALLEGRA ALLERGY PO) Take by mouth    Historical Provider, MD    Scheduled Meds:   rocuronium         Continuous Infusions:   propofol 10 mcg/kg/min (04/15/20 2014)     PRN Meds:.etomidate, rocuronium  Past Medical History   has a past medical history of Allergic rhinitis, Ankylosis of left knee, ASHD (arteriosclerotic heart disease), Asthma-COPD overlap syndrome ([address] Atrial fibrillation ([address] Bronchiectasis ([address] Charcot's joint, Chronic low back pain, CKD (chronic kidney disease) stage 3, GFR 30-59 ml/min ([address] Diabetic polyneuropathy associated with type 2 diabetes mellitus ([address] DM2 (diabetes mellitus, type 2) ([address] Fibromyalgia, History of DVT of lower extremity, History of pulmonary embolism, Hypertension, essential, Hypothyroidism, Left rotator cuff tear, Lymphedema, Microalbuminuria due to type 2 diabetes mellitus ([address] Morbid obesity ([address] STUART treated with BiPAP, and

## 2020-04-16 NOTE — PROGRESS NOTES
[facility]           Progress Note    Patient Name:  Kelsey Chao  Medical Record Number:  [id number]  YOB: 1945    Date:  4/16/2020  Time:  12:39 PM  Completed By:  Jennifer Davidson RN    Reason for Palliative Care Evaluation:  Goals of care    Advance Directives:none on file  [] [facility] DNR Form  [] Living Will  [] Medical POA    Current Code Status  [] Full Resuscitation  [x] DNR-Comfort Care-Arrest  [x] DNR-Comfort Care  [] Limited   [] No CPR   [] No shock   [] No ET intubation/reintubation   [] No resuscitative medications   [] Other limitation:    Performance Status:  10    Family/Patient Discussion:  Patient is unresponsive on the ventilator. Patient's , Rose Mary Lee and nephew, Marci Villafuerte are present. Palliative care introduced. Dr. Cali Romero in and updated the family regarding the patient's \"unsurvivable\" condition. Rose Mary Lee states that he and his wife moved to [facility] recently as they previously resided in [place]. This couple has no children. Patient's brother lives in [facility]. Rose Mary Lee states that everything was so sudden and it has been difficult to comprehend. Rose Mary Lee states that at this time, he wishes to continue current treatments but would not want the patient to be resuscitated if she were to progress to that point. Discussed the it is likely that the patient will require more and more support as her condition worsens. Discussed that the benefits of continued efforts will provide him with more time to comprehend what has happened but that it will not change the outcome of his wife. Discussed comfort care and withdrawal of life support measures and what this entails. Much emotional support provided. Plan/Follow-Up:  Spiritual called to assist with spiritual needs of the family/patient. Updated primary RN, Rosanna aCro. Palliative care will continue to assist as appropriate.     Jennifer Davidson RN  4/16/2020,  12:39 PM

## 2020-04-16 NOTE — ED PROVIDER NOTES
Surgical History:   Procedure Laterality Date    ANKLE FUSION Right     CARPAL TUNNEL RELEASE      CATARACT REMOVAL      CORONARY ANGIOPLASTY WITH STENT PLACEMENT      FOOT SURGERY      KNEE SURGERY Left     LEG SURGERY      rods placed    TONSILLECTOMY      VENA CAVA FILTER PLACEMENT           CURRENT MEDICATIONS       Previous Medications    ACETAMINOPHEN (TYLENOL) 500 MG TABLET    Take 1 tablet by mouth 2 times daily as needed    ALBUTEROL SULFATE HFA (VENTOLIN HFA) 108 (90 BASE) MCG/ACT INHALER    Inhale 2 puffs into the lungs every 4 hours as needed for Wheezing or Shortness of Breath    AZELASTINE  MCG/SPRAY SOLN    2 sprays by Nasal route daily    BECLOMETHASONE (QVAR) 80 MCG/ACT INHALER    Inhale 2 puffs into the lungs 2 times daily    BIMATOPROST (LUMIGAN) 0.01 % SOLN OPHTHALMIC DROPS        BIPAP MACHINE MISC    by Does not apply route    BUDESONIDE-FORMOTEROL (SYMBICORT) 160-4.5 MCG/ACT AERO    Inhale 2 puffs into the lungs 2 times daily    DILTIAZEM (CARDIZEM CD) 120 MG EXTENDED RELEASE CAPSULE    Take 1 capsule by mouth 2 times daily    DOCUSATE SODIUM (COLACE) 100 MG CAPSULE    Take 100 mg by mouth 2 times daily    DOXYCYCLINE HYCLATE (VIBRAMYCIN) 100 MG CAPSULE    Take 1 capsule by mouth 2 times daily    DRONEDARONE HCL (MULTAQ) 400 MG TABS    Take 400 mg by mouth 2 times daily (with meals)    FERROUS SULFATE (IRON 325) 325 (65 FE) MG TABLET    Take 1 tablet by mouth 2 times daily (with meals)    FEXOFENADINE HCL (ALLEGRA ALLERGY PO)    Take by mouth    FUROSEMIDE (LASIX) 20 MG TABLET    Take 10 mg by mouth daily as needed     INSULIN ASPART (NOVOLOG FLEXPEN) 100 UNIT/ML INJECTION PEN    Inject 8 Units into the skin 3 times daily (before meals)    INSULIN GLARGINE (TOUJEO SOLOSTAR SC)    Inject 60 Units into the skin every morning (before breakfast)    INSULIN PEN NEEDLE (PEN NEEDLES) 31G X 8 MM MISC    Use to inject 4 times per day. Do not reuse needles.     IPRATROPIUM (ATROVENT) 0.06 needs     Medical: None     Non-medical: None   Tobacco Use    Smoking status: Never Smoker    Smokeless tobacco: Never Used   Substance and Sexual Activity    Alcohol use: No    Drug use: No    Sexual activity: None   Lifestyle    Physical activity     Days per week: None     Minutes per session: None    Stress: None   Relationships    Social connections     Talks on phone: None     Gets together: None     Attends Mosque service: None     Active member of club or organization: None     Attends meetings of clubs or organizations: None     Relationship status: None    Intimate partner violence     Fear of current or ex partner: None     Emotionally abused: None     Physically abused: None     Forced sexual activity: None   Other Topics Concern    None   Social History Narrative    None       SCREENINGS    Dorris Coma Scale  Eye Opening: None  Best Verbal Response: None  Best Motor Response: Withdraws from pain  Dorris Coma Scale Score: 6           PHYSICAL EXAM    (up to 7 for level 4, 8 or more for level 5)     ED Triage Vitals   BP Temp Temp Source Pulse Resp SpO2 Height Weight   04/15/20 1955 04/15/20 2016 04/15/20 2016 04/15/20 1955 04/15/20 1955 04/15/20 1955 [birth year]   (!) 219/75 95.9 °F (35.5 °C) CORE 78 16 100 % 5' 8\" (1.727 m) 274 lb (124.3 kg)       Physical Exam  Vitals signs and nursing note reviewed. Constitutional:       General: She is in acute distress. Appearance: She is well-developed. She is ill-appearing and toxic-appearing. She is not diaphoretic. Comments: Nonrebreather, emesis   HENT:      Head: Normocephalic. Mouth/Throat:      Mouth: Mucous membranes are moist.      Pharynx: No oropharyngeal exudate. Comments: Gastric contents in the mouth  Eyes:      General:         Right eye: No discharge. Left eye: No discharge. Comments: Right pupil is 5 mm, left pupil is 3 mm.   Neither pupil reactive   Neck:      Musculoskeletal: Neck supple. Trachea: No tracheal deviation. Cardiovascular:      Rate and Rhythm: Normal rate and regular rhythm. Heart sounds: Normal heart sounds. No murmur. Pulmonary:      Breath sounds: No stridor. No wheezing. Comments: Irregular breathing  Abdominal:      General: Bowel sounds are normal. There is no distension. Palpations: Abdomen is soft. Tenderness: There is no abdominal tenderness. There is no guarding or rebound. Skin:     General: Skin is warm and dry. Capillary Refill: Capillary refill takes less than 2 seconds. Findings: No erythema or rash. Neurological:      Mental Status: She is unresponsive. GCS: GCS eye subscore is 1. GCS verbal subscore is 1. GCS motor subscore is 1. Motor: No abnormal muscle tone. Comments: Unresponsive         DIAGNOSTIC RESULTS     EKG: All EKG's are interpreted by the Emergency Department Physician who either signs or Co-signs this chart in the absence of a cardiologist.    Ventricular rate of 86, occasional PVC. No T wave inversions. no STEMI    RADIOLOGY:   Non-plain film images such as CT, Ultrasound and MRI are read by the radiologist. Plain radiographic images are visualized and preliminarily interpreted by the emergency physician with the below findings:      Interpretation per the Radiologist below, if available at the time of this note:    CT Head WO Contrast   Final Result   1. Large intraparenchymal hemorrhage in the right temporal and parietal lobes with extensive mass effect on surrounding brain parenchyma. 2. Transfalcine herniation with right to left shift of 1.2 cm. 3. Large intraventricular hemorrhage. Critical findings were discussed with Dr. Mayra Pisano on 4/15/2020 at 8:57 PM.            **This report has been created using voice recognition software. It may contain minor errors which are inherent in voice recognition technology. **      Final report electronically signed by Dr. Jan Irizarry on 4/15/2020 9:02 PM      CTA HEAD W WO CONTRAST (CODE STROKE NIHSS 4 or Above)         CTA NECK W WO CONTRAST (CODE STROKE NIHSS 4 or Above)         XR CHEST PORTABLE   Final Result   1. Endotracheal and nasogastric tubes as above. 2. No acute intrathoracic process. **This report has been created using voice recognition software. It may contain minor errors which are inherent in voice recognition technology. **      Final report electronically signed by Dr. Soila Boeck on 4/15/2020 8:53 PM            ED BEDSIDE ULTRASOUND:   Performed by ED Physician - none    LABS:  Labs Reviewed   CBC WITH AUTO DIFFERENTIAL - Abnormal; Notable for the following components:       Result Value    WBC 13.3 (*)     RBC 4.06 (*)     Hemoglobin 11.0 (*)     Hematocrit 36.4 (*)     MCHC 30.2 (*)     RDW-CV 20.6 (*)     RDW-SD 67.9 (*)     Segs Absolute 8.2 (*)     Eosinophils Absolute 0.5 (*)     All other components within normal limits   COMPREHENSIVE METABOLIC PANEL W/ REFLEX TO MG FOR LOW K - Abnormal; Notable for the following components:    Glucose 152 (*)     All other components within normal limits   PROTIME-INR - Abnormal; Notable for the following components:    INR 1.56 (*)     All other components within normal limits   TROPONIN - Abnormal; Notable for the following components:    Troponin T 0.014 (*)     All other components within normal limits   URINE WITH REFLEXED MICRO - Abnormal; Notable for the following components:    Blood, Urine SMALL (*)     Protein,  (*)     All other components within normal limits   GLOMERULAR FILTRATION RATE, ESTIMATED - Abnormal; Notable for the following components:    Est, Glom Filt Rate 61 (*)     All other components within normal limits   ANION GAP   OSMOLALITY   SCAN OF BLOOD SMEAR       All other labs were within normal range or not returned as of this dictation.     EMERGENCY DEPARTMENT COURSE and DIFFERENTIAL DIAGNOSIS/MDM:   Vitals:    Vitals:    04/15/20

## 2020-04-16 NOTE — ED NOTES
Equal and bilateral breath sounds heard by Will RN.  Successful intubation by Dr. Gamaliel Mccloud, JO  04/15/20 2002

## 2020-04-17 NOTE — CONSULTS
[address] [place] [address]                                  CONSULTATION    PATIENT NAME: Jose Velázquez                      :        1945  MED REC NO:   [id number]                           ROOM:       0003  ACCOUNT NO:   [de-identified]                           ADMIT DATE: 04/15/2020  PROVIDER:     Annemarie Espinoza. PANKAJ Argueta Later:  04/15/2020    NEUROSURGERY CONSULTATION    TIME OF SERVICE:  10:05 p.m. HISTORY OF PRESENT ILLNESS:  The patient is a 66-year-old female. She  had syncopal episode at home, slipped, and then became unconscious  according to her . The patient was having severe headache before  this occurred. CT scan shows massive intracerebral and intraventricular  hemorrhage including a transfalcial herniation, midline shift to the  brain, compression of the brain. There is blood, extensive amount of  blood in the right lateral ventricle as well as in the third and fourth  ventricle and small amount in the left lateral ventricle. The patient  has been on Xarelto. ALLERGIES:  She is allergic to OXYCODONE, CEFDINIR, STATINS, SULFA  ANTIBIOTICS, VALIUM. The patient is unable to participate in the review of systems or to give  any history. She has already been intubated. Her INR is 1.56. I have  discussed the case with neuro interventionalists who saw the patient  through [facility], that is Dr. Donavan Cook. I have also  discussed the case with the emergency room physician, Dr. Carvel Fothergill. The patient's  was at the bedside when I saw her, and I saw her  in room #4 in the emergency room at 10:05 p.m. on 04/15/2020. PHYSICAL EXAMINATION:  GENERAL:  On examination, the patient is on a ventilator. HEENT:  The pupils are 6 mm and fixed bilaterally. There is no  withdrawal.  No eye opening. No cough or gags noted. She does have a  lump on her left forehead, which is somewhat red.

## 2020-04-17 NOTE — PROGRESS NOTES
mmHg  Position: Semi-Vela's  Humidification Source: HME  Oral Care: Mouth swabbed, Mouth suctioned, Mouth moisturizer  Subglottic Suction Done?: Yes  ABG   No results found for: PH, PCO2, PO2, HCO3, O2SAT  No results found for: IFIO2, MODE, SETTIDVOL, SETPEEP  Medications      amiodarone 0.5 mg/min (04/17/20 0834)    sodium chloride 100 mL/hr at 04/17/20 0503    dextrose      norepinephrine 45 mcg/min (04/17/20 0841)    propofol Stopped (04/16/20 1900)      levothyroxine  100 mcg Per NG tube Daily    sodium hypochlorite   Irrigation Daily    sodium chloride flush  10 mL Intravenous 2 times per day    chlorhexidine  15 mL Mouth/Throat BID    famotidine (PEPCID) injection  20 mg Intravenous BID    insulin lispro  0-12 Units Subcutaneous Q6H     IV  Diet/Nutrition   DIET TUBE FEED CONTINUOUS/CYCLIC NPO; Semi-elemental (Vital 1.2); Orogastric; Continuous; 10; 10    SUPPLEMENTAL O2:   Vented    BP SUPPORTING AGENTS: Levophed    SEDATION/ANALGESIA: None    NUTRITION SUPPORT: None    Exam   VITALS    height is 5' 8\" (1.727 m) and weight is 274 lb (124.3 kg). Her bladder temperature is 99.1 °F (37.3 °C). Her blood pressure is 94/50 (abnormal) and her pulse is 147. Her respiration is 19 and oxygen saturation is 93%. Constitutional - Comatose  General Appearance BMI 41  HEENT - Life support devices in place (ET, OG),normocephalic, atraumatic. Lungs - Chest expands equally, no wheezes, rales or rhonchi. Cardiovascular - AF with RVR  Abdomen - soft, hypoactive BS  Neurologic - Comatose, not moving, no response to painful stimuli, GCS 3, pupils 4-5 mm non responsive, spontaneous breathing above the vent when RR decreased.   Skin - no bruising or bleeding  Extremities - edematous  Labs   ABG  No results found for: PH, PO2, PCO2, HCO3, O2SAT  No results found for: IFIO2, MODE, SETTIDVOL, SETPEEP  CBC  Recent Labs     04/15/20  2019 04/16/20  0325   WBC 13.3* 8.6   RBC 4.06* 3.53*   HGB 11.0* 9.5*   HCT 36.4* 31.3* parietal    bones. No depressed calvarial fracture is identified.           Impression   1. Large intraparenchymal hemorrhage in the right temporal and parietal lobes with extensive mass effect on surrounding brain parenchyma. 2. Transfalcine herniation with right to left shift of 1.2 cm. 3. Large intraventricular hemorrhage.       Critical findings were discussed with Dr. Breanna Martell on 4/15/2020 at 8:57 PM.               **This report has been created using voice recognition software. It may contain minor errors which are inherent in voice recognition technology. **       Final report electronically signed by Dr. Ramesh Diane on 4/15/2020 9:02 PM                ICH Score         [place] Coma Score             Last    13-15= 0 points   5-12= 1 point       3-4= 2 points 2   Age greater than or equal to [de-identified]          76 y.o.     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 Remains comatose with hemodynamic instability, evaluation and recommendations by NS and neurointensivist reviewed with  and he is requesting extubation and comfort care, palliative care on board for support, will await arrival of nephew to proceed.     Sherman Montilla MD on 4/17/2020 at 9:30 AM

## 2020-04-17 NOTE — PROGRESS NOTES
[address] [id number]  PHYSICAL THERAPY MISSED TREATMENT NOTE  ACUTE CARE  [facility] 3A              Missed Treatment  Pt will be terminally weaned from vent today. Will discontinue orders .

## 2020-04-18 LAB
EKG ATRIAL RATE: 136 BPM
EKG Q-T INTERVAL: 328 MS
EKG QRS DURATION: 84 MS
EKG QTC CALCULATION (BAZETT): 467 MS
EKG R AXIS: -28 DEGREES
EKG T AXIS: 58 DEGREES
EKG VENTRICULAR RATE: 122 BPM
GRAM STAIN RESULT: NORMAL
MRSA SCREEN: NORMAL
RESPIRATORY CULTURE: NORMAL
URINE CULTURE, ROUTINE: NORMAL

## 2020-04-18 PROCEDURE — 93010 ELECTROCARDIOGRAM REPORT: CPT | Performed by: INTERNAL MEDICINE

## 2021-01-11 NOTE — PLAN OF CARE
Problem: Pain:  Description  Pain management should include both nonpharmacologic and pharmacologic interventions. Goal: Pain level will decrease  Description  Pain level will decrease  1/18/2020 1533 by Juan C Ramsey RN  Outcome: Ongoing    Problem: Risk for Impaired Skin Integrity  Goal: Tissue integrity - skin and mucous membranes  Description  Structural intactness and normal physiological function of skin and  mucous membranes. 1/18/2020 1533 by Buelah Palmer RN  Outcome: Ongoing  Turn and reposition to relieve pressure, patient tolerates turning poorly, screams in pain    Problem: Falls - Risk of:  Goal: Will remain free from falls  Description  Will remain free from falls  1/18/2020 1533 by Beulah Palmer RN  Outcome: Ongoing  1/18/2020 1533 by Beulah Palmer RN  Outcome: Ongoing  Goal: Absence of physical injury  Description  Absence of physical injury  1/18/2020 1533 by Beulah Palmer RN  Outcome: Ongoing  Absence of fall or injury this shift    Problem: Cardiovascular  Goal: No DVT, peripheral vascular complications  4/67/8155 1533 by Beulah Palmer RN  Outcome: Ongoing  Vitals within normal limits, tele continued    Problem: Respiratory  Goal: No pulmonary complications  2/50/8324 1533 by Beulah Palmer RN  Outcome: Ongoing  1/18/2020 1533 by Beulah Palmer RN  Outcome: Ongoing  Lungs clear throughout, denies shortness of breath or chest pain  Problem: Discharge Planning:  Goal: Patients continuum of care needs are met  Description  Patients continuum of care needs are met  1/18/2020 1533 by Beulah Palmer RN  Outcome: Ongoing  Care plan reviewed with patient and she verbalize understanding of the plan of care and contribute to goal setting. [Follow-Up: _____] : a [unfilled] follow-up visit [FreeTextEntry1] : 9/3/20 Laparoscopic sigmoid resection

## 2022-07-26 NOTE — ED NOTES
Dr. Caty Smart on stroke bot states no [facility] but give mannitol and aretha Clemente RN  04/15/20 2116 There are no preventive care reminders to display for this patient.    Patient is up to date, no discussion needed.